# Patient Record
Sex: MALE | Race: WHITE | NOT HISPANIC OR LATINO | Employment: OTHER | URBAN - METROPOLITAN AREA
[De-identification: names, ages, dates, MRNs, and addresses within clinical notes are randomized per-mention and may not be internally consistent; named-entity substitution may affect disease eponyms.]

---

## 2023-03-27 DIAGNOSIS — F33.41 RECURRENT MAJOR DEPRESSIVE DISORDER, IN PARTIAL REMISSION (CMS-HCC): Primary | ICD-10-CM

## 2023-03-27 DIAGNOSIS — I10 PRIMARY HYPERTENSION: ICD-10-CM

## 2023-03-27 RX ORDER — VILAZODONE HYDROCHLORIDE 40 MG/1
40 TABLET ORAL DAILY
Qty: 30 TABLET | Refills: 11 | Status: SHIPPED | OUTPATIENT
Start: 2023-03-27 | End: 2024-04-19 | Stop reason: SDUPTHER

## 2023-03-27 RX ORDER — LOSARTAN POTASSIUM 100 MG/1
100 TABLET ORAL DAILY
COMMUNITY
End: 2023-03-27 | Stop reason: SDUPTHER

## 2023-03-27 RX ORDER — VILAZODONE HYDROCHLORIDE 40 MG/1
1 TABLET ORAL DAILY
COMMUNITY
Start: 2022-03-01 | End: 2023-03-27 | Stop reason: SDUPTHER

## 2023-03-27 RX ORDER — LOSARTAN POTASSIUM 100 MG/1
100 TABLET ORAL DAILY
Qty: 30 TABLET | Refills: 11 | Status: SHIPPED | OUTPATIENT
Start: 2023-03-27 | End: 2024-03-25 | Stop reason: SDUPTHER

## 2023-04-14 DIAGNOSIS — E78.01 FAMILIAL HYPERCHOLESTEROLEMIA: ICD-10-CM

## 2023-04-14 DIAGNOSIS — I25.10 CORONARY ARTERY DISEASE INVOLVING NATIVE HEART, UNSPECIFIED VESSEL OR LESION TYPE, UNSPECIFIED WHETHER ANGINA PRESENT: ICD-10-CM

## 2023-04-14 DIAGNOSIS — G47.00 INSOMNIA, UNSPECIFIED TYPE: Primary | ICD-10-CM

## 2023-04-14 RX ORDER — PRAMIPEXOLE DIHYDROCHLORIDE 0.5 MG/1
TABLET ORAL
COMMUNITY
End: 2023-12-19 | Stop reason: ALTCHOICE

## 2023-04-14 RX ORDER — CLOPIDOGREL BISULFATE 75 MG/1
75 TABLET ORAL DAILY
COMMUNITY
End: 2023-06-13 | Stop reason: SDUPTHER

## 2023-04-14 RX ORDER — BUPROPION HYDROCHLORIDE 200 MG/1
200 TABLET, EXTENDED RELEASE ORAL EVERY 12 HOURS
COMMUNITY
End: 2023-09-11 | Stop reason: SDUPTHER

## 2023-04-14 RX ORDER — TRAZODONE HYDROCHLORIDE 50 MG/1
TABLET ORAL
Qty: 135 TABLET | Refills: 3 | Status: SHIPPED | OUTPATIENT
Start: 2023-04-14 | End: 2024-04-18 | Stop reason: SDUPTHER

## 2023-04-14 RX ORDER — LEVOTHYROXINE SODIUM 175 UG/1
175 TABLET ORAL DAILY
COMMUNITY
End: 2023-05-01 | Stop reason: SDUPTHER

## 2023-04-14 RX ORDER — QUETIAPINE FUMARATE 50 MG/1
100 TABLET, FILM COATED ORAL NIGHTLY
COMMUNITY
End: 2023-08-22 | Stop reason: SDUPTHER

## 2023-04-14 RX ORDER — ROSUVASTATIN CALCIUM 40 MG/1
40 TABLET, COATED ORAL DAILY
COMMUNITY
End: 2023-04-14 | Stop reason: SDUPTHER

## 2023-04-14 RX ORDER — EZETIMIBE 10 MG/1
10 TABLET ORAL DAILY
COMMUNITY
End: 2024-01-15 | Stop reason: SDUPTHER

## 2023-04-14 RX ORDER — GABAPENTIN 100 MG/1
CAPSULE ORAL
COMMUNITY
End: 2023-06-14 | Stop reason: ALTCHOICE

## 2023-04-14 RX ORDER — TRAZODONE HYDROCHLORIDE 50 MG/1
TABLET ORAL
COMMUNITY
End: 2023-04-14 | Stop reason: SDUPTHER

## 2023-04-14 RX ORDER — DONEPEZIL HYDROCHLORIDE 5 MG/1
5 TABLET, FILM COATED ORAL DAILY
COMMUNITY
End: 2023-11-14 | Stop reason: DRUGHIGH

## 2023-04-14 RX ORDER — ESCITALOPRAM OXALATE 20 MG/1
TABLET ORAL
COMMUNITY
End: 2023-12-19 | Stop reason: ALTCHOICE

## 2023-04-14 RX ORDER — ASPIRIN 81 MG/1
TABLET ORAL 2 TIMES DAILY
COMMUNITY
Start: 2021-06-03 | End: 2023-12-19 | Stop reason: ALTCHOICE

## 2023-04-14 RX ORDER — ROSUVASTATIN CALCIUM 40 MG/1
40 TABLET, COATED ORAL DAILY
Qty: 90 TABLET | Refills: 3 | Status: SHIPPED | OUTPATIENT
Start: 2023-04-14 | End: 2024-04-08 | Stop reason: SDUPTHER

## 2023-05-01 DIAGNOSIS — E03.9 HYPOTHYROIDISM, UNSPECIFIED TYPE: Primary | ICD-10-CM

## 2023-05-02 RX ORDER — LEVOTHYROXINE SODIUM 175 UG/1
175 TABLET ORAL DAILY
Qty: 30 TABLET | Refills: 2 | Status: SHIPPED | OUTPATIENT
Start: 2023-05-02 | End: 2023-08-06 | Stop reason: SDUPTHER

## 2023-06-13 DIAGNOSIS — I25.10 CORONARY ARTERY DISEASE INVOLVING NATIVE CORONARY ARTERY OF NATIVE HEART WITHOUT ANGINA PECTORIS: Primary | ICD-10-CM

## 2023-06-13 RX ORDER — CLOPIDOGREL BISULFATE 75 MG/1
75 TABLET ORAL DAILY
Qty: 90 TABLET | Refills: 3 | Status: SHIPPED | OUTPATIENT
Start: 2023-06-13 | End: 2023-09-11 | Stop reason: SDUPTHER

## 2023-06-14 ENCOUNTER — OFFICE VISIT (OUTPATIENT)
Dept: PRIMARY CARE | Facility: CLINIC | Age: 68
End: 2023-06-14
Payer: MEDICARE

## 2023-06-14 VITALS
HEART RATE: 60 BPM | BODY MASS INDEX: 24.48 KG/M2 | TEMPERATURE: 97.5 F | SYSTOLIC BLOOD PRESSURE: 128 MMHG | HEIGHT: 67 IN | WEIGHT: 156 LBS | OXYGEN SATURATION: 97 % | DIASTOLIC BLOOD PRESSURE: 70 MMHG

## 2023-06-14 DIAGNOSIS — E78.2 MIXED HYPERLIPIDEMIA: ICD-10-CM

## 2023-06-14 DIAGNOSIS — Z00.00 MEDICARE ANNUAL WELLNESS VISIT, SUBSEQUENT: ICD-10-CM

## 2023-06-14 DIAGNOSIS — M17.12 PRIMARY OSTEOARTHRITIS OF LEFT KNEE: ICD-10-CM

## 2023-06-14 DIAGNOSIS — F32.A ANXIETY AND DEPRESSION: ICD-10-CM

## 2023-06-14 DIAGNOSIS — Z12.5 SCREENING FOR PROSTATE CANCER: ICD-10-CM

## 2023-06-14 DIAGNOSIS — I10 BENIGN ESSENTIAL HYPERTENSION: ICD-10-CM

## 2023-06-14 DIAGNOSIS — D50.0 IRON DEFICIENCY ANEMIA SECONDARY TO BLOOD LOSS (CHRONIC): ICD-10-CM

## 2023-06-14 DIAGNOSIS — Z00.00 ROUTINE GENERAL MEDICAL EXAMINATION AT HEALTH CARE FACILITY: Primary | ICD-10-CM

## 2023-06-14 DIAGNOSIS — G25.81 RESTLESS LEGS SYNDROME: ICD-10-CM

## 2023-06-14 DIAGNOSIS — F51.04 CHRONIC INSOMNIA: ICD-10-CM

## 2023-06-14 DIAGNOSIS — E55.9 VITAMIN D DEFICIENCY: ICD-10-CM

## 2023-06-14 DIAGNOSIS — E03.8 OTHER SPECIFIED HYPOTHYROIDISM: ICD-10-CM

## 2023-06-14 DIAGNOSIS — Z12.11 COLON CANCER SCREENING: ICD-10-CM

## 2023-06-14 DIAGNOSIS — F41.9 ANXIETY AND DEPRESSION: ICD-10-CM

## 2023-06-14 DIAGNOSIS — G31.83 DEMENTIA, LEWY BODY WITH BEHAVIOR DISTURBANCE (MULTI): ICD-10-CM

## 2023-06-14 DIAGNOSIS — R63.4 WEIGHT LOSS: ICD-10-CM

## 2023-06-14 DIAGNOSIS — F02.818 DEMENTIA, LEWY BODY WITH BEHAVIOR DISTURBANCE (MULTI): ICD-10-CM

## 2023-06-14 PROBLEM — M19.012 GLENOHUMERAL ARTHRITIS, LEFT: Status: ACTIVE | Noted: 2023-06-14

## 2023-06-14 PROBLEM — Z86.0100 HISTORY OF COLON POLYPS: Status: ACTIVE | Noted: 2023-06-14

## 2023-06-14 PROBLEM — M25.612 DECREASED RANGE OF MOTION OF LEFT SHOULDER: Status: ACTIVE | Noted: 2023-06-14

## 2023-06-14 PROBLEM — E03.9 HYPOTHYROIDISM: Status: ACTIVE | Noted: 2023-06-14

## 2023-06-14 PROBLEM — Z96.651 STATUS POST TOTAL RIGHT KNEE REPLACEMENT: Status: ACTIVE | Noted: 2023-06-14

## 2023-06-14 PROBLEM — R42 POSTURAL DIZZINESS WITH PRESYNCOPE: Status: ACTIVE | Noted: 2023-06-14

## 2023-06-14 PROBLEM — F03.90 DEMENTIA (MULTI): Status: ACTIVE | Noted: 2023-06-14

## 2023-06-14 PROBLEM — I25.10 ASHD (ARTERIOSCLEROTIC HEART DISEASE): Status: ACTIVE | Noted: 2023-06-14

## 2023-06-14 PROBLEM — I89.0 LYMPHEDEMA OF RIGHT LOWER EXTREMITY: Status: ACTIVE | Noted: 2023-06-14

## 2023-06-14 PROBLEM — M75.21 BICEPS TENDINITIS OF RIGHT UPPER EXTREMITY: Status: ACTIVE | Noted: 2023-06-14

## 2023-06-14 PROBLEM — S46.012A TRAUMATIC COMPLETE TEAR OF LEFT ROTATOR CUFF: Status: ACTIVE | Noted: 2023-06-14

## 2023-06-14 PROBLEM — Z86.010 HISTORY OF COLON POLYPS: Status: ACTIVE | Noted: 2023-06-14

## 2023-06-14 PROBLEM — R55 POSTURAL DIZZINESS WITH PRESYNCOPE: Status: ACTIVE | Noted: 2023-06-14

## 2023-06-14 PROBLEM — S40.012S: Status: ACTIVE | Noted: 2023-06-14

## 2023-06-14 PROBLEM — M21.169 VARUS DEFORMITY OF KNEE: Status: ACTIVE | Noted: 2023-06-14

## 2023-06-14 PROBLEM — H91.13 PRESBYCUSIS OF BOTH EARS: Status: ACTIVE | Noted: 2023-06-14

## 2023-06-14 PROBLEM — R06.09 DOE (DYSPNEA ON EXERTION): Status: ACTIVE | Noted: 2023-06-14

## 2023-06-14 PROBLEM — Z95.5 PRESENCE OF CORONARY ANGIOPLASTY IMPLANT AND GRAFT: Status: ACTIVE | Noted: 2023-06-14

## 2023-06-14 PROCEDURE — 20610 DRAIN/INJ JOINT/BURSA W/O US: CPT | Performed by: FAMILY MEDICINE

## 2023-06-14 PROCEDURE — 1170F FXNL STATUS ASSESSED: CPT | Performed by: FAMILY MEDICINE

## 2023-06-14 PROCEDURE — 99214 OFFICE O/P EST MOD 30 MIN: CPT | Performed by: FAMILY MEDICINE

## 2023-06-14 PROCEDURE — 3074F SYST BP LT 130 MM HG: CPT | Performed by: FAMILY MEDICINE

## 2023-06-14 PROCEDURE — 1159F MED LIST DOCD IN RCRD: CPT | Performed by: FAMILY MEDICINE

## 2023-06-14 PROCEDURE — 3078F DIAST BP <80 MM HG: CPT | Performed by: FAMILY MEDICINE

## 2023-06-14 PROCEDURE — G0439 PPPS, SUBSEQ VISIT: HCPCS | Performed by: FAMILY MEDICINE

## 2023-06-14 PROCEDURE — 1160F RVW MEDS BY RX/DR IN RCRD: CPT | Performed by: FAMILY MEDICINE

## 2023-06-14 RX ORDER — ALPRAZOLAM 0.5 MG/1
TABLET ORAL
COMMUNITY
End: 2023-12-19 | Stop reason: ALTCHOICE

## 2023-06-14 RX ORDER — IBUPROFEN 100 MG/5ML
1 SUSPENSION, ORAL (FINAL DOSE FORM) ORAL 2 TIMES DAILY
COMMUNITY
Start: 2020-11-17

## 2023-06-14 RX ORDER — GABAPENTIN 300 MG/1
1 CAPSULE ORAL NIGHTLY
COMMUNITY
Start: 2022-11-07 | End: 2023-11-14 | Stop reason: DRUGHIGH

## 2023-06-14 RX ORDER — PRAMIPEXOLE DIHYDROCHLORIDE 1.5 MG/1
1.5 TABLET ORAL NIGHTLY
COMMUNITY
End: 2023-12-19 | Stop reason: ALTCHOICE

## 2023-06-14 RX ORDER — MULTIVIT-MIN/FA/LYCOPEN/LUTEIN .4-300-25
1 TABLET ORAL DAILY
COMMUNITY
Start: 2020-11-17

## 2023-06-14 RX ORDER — DEXTROMETHORPHAN HYDROBROMIDE, GUAIFENESIN 5; 100 MG/5ML; MG/5ML
2 LIQUID ORAL 2 TIMES DAILY
COMMUNITY
Start: 2020-11-17

## 2023-06-14 RX ORDER — TRIAMCINOLONE ACETONIDE 40 MG/ML
40 INJECTION, SUSPENSION INTRA-ARTICULAR; INTRAMUSCULAR ONCE
Status: COMPLETED | OUTPATIENT
Start: 2023-06-14 | End: 2023-06-14

## 2023-06-14 RX ORDER — ACETAMINOPHEN 160 MG/5ML
SUSPENSION, ORAL (FINAL DOSE FORM) ORAL
COMMUNITY
Start: 2020-11-17

## 2023-06-14 RX ADMIN — TRIAMCINOLONE ACETONIDE 40 MG: 40 INJECTION, SUSPENSION INTRA-ARTICULAR; INTRAMUSCULAR at 12:08

## 2023-06-14 ASSESSMENT — PATIENT HEALTH QUESTIONNAIRE - PHQ9
2. FEELING DOWN, DEPRESSED OR HOPELESS: NOT AT ALL
SUM OF ALL RESPONSES TO PHQ9 QUESTIONS 1 AND 2: 0
1. LITTLE INTEREST OR PLEASURE IN DOING THINGS: NOT AT ALL

## 2023-06-14 ASSESSMENT — ACTIVITIES OF DAILY LIVING (ADL)
DOING_HOUSEWORK: NEEDS ASSISTANCE
BATHING: INDEPENDENT
DRESSING: INDEPENDENT
TAKING_MEDICATION: NEEDS ASSISTANCE
MANAGING_FINANCES: NEEDS ASSISTANCE
GROCERY_SHOPPING: NEEDS ASSISTANCE

## 2023-06-14 NOTE — PROGRESS NOTES
"Subjective   Reason for Visit: Ousmane Samuel is an 67 y.o. male here for a Medicare Wellness visit.          Reviewed all medications by prescribing practitioner or clinical pharmacist (such as prescriptions, OTCs, herbal therapies and supplements) and documented in the medical record.    HPI  Reviewed Chronic illnesess    Pain in the in the left leg knee pain.  Is acting up a lot.  Up and down steps makes worse. Having pain in the knee posterior.  No swelling.  Walking makes it worse and   Was about a week or so and had the pain.    Had relief with knee joint injection  Patient Care Team:  Mason Michael DO as PCP - General  Mason Michael DO as PCP - [facility] Attributed Provider     Review of Systems    Objective   Vitals:  /70   Pulse 60   Temp 36.4 °C (97.5 °F)   Ht 1.689 m (5' 6.5\")   Wt 70.8 kg (156 lb)   SpO2 97%   BMI 24.80 kg/m²       Physical Exam  General: Patient is alert and oriented Ãƒâ€”3 and appears in no acute distress. No respiratory distress.     Head: Atraumatic normocephalic.     Eyes: EOMI, PERRLA      Neck: Normal range of motion, no masses. Thyroid is palpable and normal in size without any nodules. No anterior cervical or posterior cervical adenopathy.     Heart: Regular rate and rhythm, every 3rd beat was a dropped beat which the patient states is his normal, no murmurs clicks or gallops     Lungs: Clear to auscultation bilaterally without any rhonchi rales or wheezing, lung sounds heard throughout all lung fields     Abdomen: Soft, nontender, no rigidity, rebound, guarding or organomegaly. Bowel sounds Ãƒâ€”4 quadrants.     Musculoskeletal: Normal range of motion, strength is grossly intact in the proximal distal muscles of the upper and lower extremities bilaterally, deep tendon reflexes +2 out of 4 and symmetric bilaterally at the patella, Achilles, biceps, triceps, sensation intact.     Nerves: Cranial nerves II through XII appear grossly intact and without deficit   "   Skin: Intact, dry, no rashes or erythema     Psych: Flat affect   Assessment/Plan   Problem List Items Addressed This Visit    None   insomnia  Seroquel 100 mg at bedtime  Trazodone      Discussed depression and anxiety.   -anxiety worse so we increased Viibryd 40 mg daily     Coronary artery disease with history of stents  Previous doctor was Dr. Russell Currently stable     Expressions dementia with behavioral disturbances secondary to Lewy body dementia  Neurologist is Dr. Bishop     Hyperlipidemia  Controlled on Zetia 10 mg 1 p.o. daily and rosuvastatin 40 mg 1 p.o. daily     Hypothyroidism  Controlled on levothyroxine 175 mcg daily     Essential hypertension  Controlled on losartan 100 mg daily     RLS  - Stopped Requip and hallucinations are improving.   - Gabapentin is helping at 300 mg and they are holding off on increasing     Left knee pain/osteoarthritis  The patient was prepped in a sterile fashion using iodide swabs and alcohol swabs.  1% lidocaine and 40 mg of Kenalog were injected into the joint using a 22-gauge needle.  Patient tolerated the procedure well.  No complications.  No blood loss.  Patient was instructed to ice the area of the injection for 15 minutes 3 times a day for the next 2 days.  Call if there is any redness, swelling, worsening pain or reaction to the medication.     Wife concerned about weight loss   - Colonoscopy reordered due to polyps  - PSA ordered  - No night sweats/ appetite changes/ abnormal lumps     Labs and medicare well in 6 months

## 2023-06-15 ENCOUNTER — LAB (OUTPATIENT)
Dept: LAB | Facility: LAB | Age: 68
End: 2023-06-15
Payer: MEDICARE

## 2023-06-15 DIAGNOSIS — Z12.5 SCREENING FOR PROSTATE CANCER: ICD-10-CM

## 2023-06-15 DIAGNOSIS — R63.4 WEIGHT LOSS: ICD-10-CM

## 2023-06-15 LAB
ALANINE AMINOTRANSFERASE (SGPT) (U/L) IN SER/PLAS: 21 U/L (ref 10–52)
ALBUMIN (G/DL) IN SER/PLAS: 4.5 G/DL (ref 3.4–5)
ALKALINE PHOSPHATASE (U/L) IN SER/PLAS: 46 U/L (ref 33–136)
ANION GAP IN SER/PLAS: 14 MMOL/L (ref 10–20)
ASPARTATE AMINOTRANSFERASE (SGOT) (U/L) IN SER/PLAS: 20 U/L (ref 9–39)
BASOPHILS (10*3/UL) IN BLOOD BY AUTOMATED COUNT: 0.01 X10E9/L (ref 0–0.1)
BASOPHILS/100 LEUKOCYTES IN BLOOD BY AUTOMATED COUNT: 0.1 % (ref 0–2)
BILIRUBIN TOTAL (MG/DL) IN SER/PLAS: 0.4 MG/DL (ref 0–1.2)
CALCIUM (MG/DL) IN SER/PLAS: 9.4 MG/DL (ref 8.6–10.3)
CARBON DIOXIDE, TOTAL (MMOL/L) IN SER/PLAS: 26 MMOL/L (ref 21–32)
CHLORIDE (MMOL/L) IN SER/PLAS: 106 MMOL/L (ref 98–107)
COBALAMIN (VITAMIN B12) (PG/ML) IN SER/PLAS: 307 PG/ML (ref 211–911)
CREATININE (MG/DL) IN SER/PLAS: 1.13 MG/DL (ref 0.5–1.3)
ERYTHROCYTE DISTRIBUTION WIDTH (RATIO) BY AUTOMATED COUNT: 11.9 % (ref 11.5–14.5)
ERYTHROCYTE MEAN CORPUSCULAR HEMOGLOBIN CONCENTRATION (G/DL) BY AUTOMATED: 32.9 G/DL (ref 32–36)
ERYTHROCYTE MEAN CORPUSCULAR VOLUME (FL) BY AUTOMATED COUNT: 97 FL (ref 80–100)
ERYTHROCYTES (10*6/UL) IN BLOOD BY AUTOMATED COUNT: 4.06 X10E12/L (ref 4.5–5.9)
GFR MALE: 70 ML/MIN/1.73M2
GLUCOSE (MG/DL) IN SER/PLAS: 115 MG/DL (ref 74–99)
HEMATOCRIT (%) IN BLOOD BY AUTOMATED COUNT: 39.2 % (ref 41–52)
HEMOGLOBIN (G/DL) IN BLOOD: 12.9 G/DL (ref 13.5–17.5)
IMMATURE GRANULOCYTES/100 LEUKOCYTES IN BLOOD BY AUTOMATED COUNT: 0.4 % (ref 0–0.9)
LEUKOCYTES (10*3/UL) IN BLOOD BY AUTOMATED COUNT: 11.3 X10E9/L (ref 4.4–11.3)
LYMPHOCYTES (10*3/UL) IN BLOOD BY AUTOMATED COUNT: 0.99 X10E9/L (ref 1.2–4.8)
LYMPHOCYTES/100 LEUKOCYTES IN BLOOD BY AUTOMATED COUNT: 8.8 % (ref 13–44)
MONOCYTES (10*3/UL) IN BLOOD BY AUTOMATED COUNT: 1.71 X10E9/L (ref 0.1–1)
MONOCYTES/100 LEUKOCYTES IN BLOOD BY AUTOMATED COUNT: 15.1 % (ref 2–10)
NEUTROPHILS (10*3/UL) IN BLOOD BY AUTOMATED COUNT: 8.53 X10E9/L (ref 1.2–7.7)
NEUTROPHILS/100 LEUKOCYTES IN BLOOD BY AUTOMATED COUNT: 75.6 % (ref 40–80)
PLATELETS (10*3/UL) IN BLOOD AUTOMATED COUNT: 161 X10E9/L (ref 150–450)
POTASSIUM (MMOL/L) IN SER/PLAS: 4.5 MMOL/L (ref 3.5–5.3)
PROSTATE SPECIFIC ANTIGEN,SCREEN: 1.03 NG/ML (ref 0–4)
PROTEIN TOTAL: 6.7 G/DL (ref 6.4–8.2)
SODIUM (MMOL/L) IN SER/PLAS: 141 MMOL/L (ref 136–145)
THYROTROPIN (MIU/L) IN SER/PLAS BY DETECTION LIMIT <= 0.05 MIU/L: 0.48 MIU/L (ref 0.44–3.98)
UREA NITROGEN (MG/DL) IN SER/PLAS: 24 MG/DL (ref 6–23)

## 2023-06-15 PROCEDURE — 36415 COLL VENOUS BLD VENIPUNCTURE: CPT

## 2023-06-15 PROCEDURE — 82607 VITAMIN B-12: CPT

## 2023-06-15 PROCEDURE — 80053 COMPREHEN METABOLIC PANEL: CPT

## 2023-06-15 PROCEDURE — 84443 ASSAY THYROID STIM HORMONE: CPT

## 2023-06-15 PROCEDURE — G0103 PSA SCREENING: HCPCS

## 2023-06-15 PROCEDURE — 85025 COMPLETE CBC W/AUTO DIFF WBC: CPT

## 2023-06-18 NOTE — RESULT ENCOUNTER NOTE
Please let the patient know that labs showed blood count slightly low but it is no change from what it previously had been.  Does look like he is fighting off possible infection.  B12 was low and I would definitely suggest that he take vitamin B12 sublingual 1000 mcg daily.  His thyroid is in a normal range but his TSH is definitely towards the lower but he was not having hyperthyroid symptoms outside of the weight loss so we will continue on the current dose.  PSA was normal for the prostate.

## 2023-06-19 ENCOUNTER — TELEPHONE (OUTPATIENT)
Dept: PRIMARY CARE | Facility: CLINIC | Age: 68
End: 2023-06-19
Payer: MEDICARE

## 2023-06-19 NOTE — TELEPHONE ENCOUNTER
----- Message from Megha Rios RN sent at 6/19/2023  8:32 AM EDT -----    ----- Message -----  From: Mason Michael DO  Sent: 6/18/2023   7:21 PM EDT  To: [id number] [place] [id number] Clinical Support Staff    Please let the patient know that labs showed blood count slightly low but it is no change from what it previously had been.  Does look like he is fighting off possible infection.  B12 was low and I would definitely suggest that he take vitamin B12 sublingual 1000 mcg daily.  His thyroid is in a normal range but his TSH is definitely towards the lower but he was not having hyperthyroid symptoms outside of the weight loss so we will continue on the current dose.  PSA was normal for the prostate.

## 2023-08-04 ENCOUNTER — PATIENT MESSAGE (OUTPATIENT)
Dept: PRIMARY CARE | Facility: CLINIC | Age: 68
End: 2023-08-04
Payer: MEDICARE

## 2023-08-04 DIAGNOSIS — E03.9 HYPOTHYROIDISM, UNSPECIFIED TYPE: ICD-10-CM

## 2023-08-06 RX ORDER — LEVOTHYROXINE SODIUM 175 UG/1
175 TABLET ORAL DAILY
Qty: 90 TABLET | Refills: 3 | Status: SHIPPED | OUTPATIENT
Start: 2023-08-06 | End: 2024-08-05

## 2023-08-22 ENCOUNTER — PATIENT MESSAGE (OUTPATIENT)
Dept: PRIMARY CARE | Facility: CLINIC | Age: 68
End: 2023-08-22
Payer: MEDICARE

## 2023-08-22 DIAGNOSIS — G31.83 DEMENTIA, LEWY BODY WITH BEHAVIOR DISTURBANCE (MULTI): Primary | ICD-10-CM

## 2023-08-22 DIAGNOSIS — F02.818 DEMENTIA, LEWY BODY WITH BEHAVIOR DISTURBANCE (MULTI): Primary | ICD-10-CM

## 2023-08-22 RX ORDER — QUETIAPINE FUMARATE 50 MG/1
100 TABLET, FILM COATED ORAL NIGHTLY
Qty: 180 TABLET | Refills: 3 | Status: SHIPPED | OUTPATIENT
Start: 2023-08-22 | End: 2023-12-19 | Stop reason: ALTCHOICE

## 2023-09-11 DIAGNOSIS — F32.A ANXIETY AND DEPRESSION: Primary | ICD-10-CM

## 2023-09-11 DIAGNOSIS — I25.10 CORONARY ARTERY DISEASE INVOLVING NATIVE CORONARY ARTERY OF NATIVE HEART WITHOUT ANGINA PECTORIS: ICD-10-CM

## 2023-09-11 DIAGNOSIS — F41.9 ANXIETY AND DEPRESSION: Primary | ICD-10-CM

## 2023-09-11 RX ORDER — CLOPIDOGREL BISULFATE 75 MG/1
75 TABLET ORAL DAILY
Qty: 90 TABLET | Refills: 3 | Status: SHIPPED | OUTPATIENT
Start: 2023-09-11 | End: 2024-09-10

## 2023-09-11 RX ORDER — BUPROPION HYDROCHLORIDE 200 MG/1
200 TABLET, EXTENDED RELEASE ORAL EVERY 12 HOURS
Qty: 180 TABLET | Refills: 3 | Status: SHIPPED | OUTPATIENT
Start: 2023-09-11 | End: 2024-09-10

## 2023-09-13 ENCOUNTER — HOSPITAL ENCOUNTER (OUTPATIENT)
Dept: DATA CONVERSION | Facility: HOSPITAL | Age: 68
End: 2023-09-13
Attending: INTERNAL MEDICINE | Admitting: INTERNAL MEDICINE
Payer: MEDICARE

## 2023-09-13 DIAGNOSIS — E03.9 HYPOTHYROIDISM, UNSPECIFIED: ICD-10-CM

## 2023-09-13 DIAGNOSIS — I10 ESSENTIAL (PRIMARY) HYPERTENSION: ICD-10-CM

## 2023-09-13 DIAGNOSIS — Z12.11 ENCOUNTER FOR SCREENING FOR MALIGNANT NEOPLASM OF COLON: ICD-10-CM

## 2023-09-13 DIAGNOSIS — F02.80 DEMENTIA IN OTHER DISEASES CLASSIFIED ELSEWHERE, UNSPECIFIED SEVERITY, WITHOUT BEHAVIORAL DISTURBANCE, PSYCHOTIC DISTURBANCE, MOOD DISTURBANCE, AND ANXIETY (MULTI): ICD-10-CM

## 2023-09-13 DIAGNOSIS — G31.83 NEUROCOGNITIVE DISORDER WITH LEWY BODIES (CODE) (MULTI): ICD-10-CM

## 2023-09-13 DIAGNOSIS — Z86.010 PERSONAL HISTORY OF COLONIC POLYPS: ICD-10-CM

## 2023-09-13 DIAGNOSIS — E78.5 HYPERLIPIDEMIA, UNSPECIFIED: ICD-10-CM

## 2023-09-13 DIAGNOSIS — I25.10 ATHEROSCLEROTIC HEART DISEASE OF NATIVE CORONARY ARTERY WITHOUT ANGINA PECTORIS: ICD-10-CM

## 2023-09-29 VITALS — BODY MASS INDEX: 26.57 KG/M2 | WEIGHT: 165.34 LBS | HEIGHT: 66 IN

## 2023-11-13 ENCOUNTER — APPOINTMENT (OUTPATIENT)
Dept: NEUROLOGY | Facility: HOSPITAL | Age: 68
End: 2023-11-13
Payer: MEDICARE

## 2023-11-14 ENCOUNTER — OFFICE VISIT (OUTPATIENT)
Dept: NEUROLOGY | Facility: HOSPITAL | Age: 68
End: 2023-11-14
Payer: MEDICARE

## 2023-11-14 VITALS
HEIGHT: 67 IN | RESPIRATION RATE: 18 BRPM | WEIGHT: 170 LBS | SYSTOLIC BLOOD PRESSURE: 138 MMHG | DIASTOLIC BLOOD PRESSURE: 79 MMHG | TEMPERATURE: 97.1 F | BODY MASS INDEX: 26.68 KG/M2 | HEART RATE: 54 BPM

## 2023-11-14 DIAGNOSIS — F03.B18 MODERATE DEMENTIA WITH BEHAVIORAL DISTURBANCE (MULTI): Primary | ICD-10-CM

## 2023-11-14 PROCEDURE — 3075F SYST BP GE 130 - 139MM HG: CPT | Performed by: PSYCHIATRY & NEUROLOGY

## 2023-11-14 PROCEDURE — 1160F RVW MEDS BY RX/DR IN RCRD: CPT | Performed by: PSYCHIATRY & NEUROLOGY

## 2023-11-14 PROCEDURE — 1126F AMNT PAIN NOTED NONE PRSNT: CPT | Performed by: PSYCHIATRY & NEUROLOGY

## 2023-11-14 PROCEDURE — 1159F MED LIST DOCD IN RCRD: CPT | Performed by: PSYCHIATRY & NEUROLOGY

## 2023-11-14 PROCEDURE — 3078F DIAST BP <80 MM HG: CPT | Performed by: PSYCHIATRY & NEUROLOGY

## 2023-11-14 PROCEDURE — 99215 OFFICE O/P EST HI 40 MIN: CPT | Performed by: PSYCHIATRY & NEUROLOGY

## 2023-11-14 RX ORDER — DONEPEZIL HYDROCHLORIDE 5 MG/1
TABLET, FILM COATED ORAL
Qty: 90 TABLET | Refills: 3 | Status: SHIPPED | OUTPATIENT
Start: 2023-11-14 | End: 2023-11-20 | Stop reason: DRUGHIGH

## 2023-11-14 RX ORDER — GABAPENTIN 300 MG/1
300 CAPSULE ORAL NIGHTLY
Qty: 90 CAPSULE | Refills: 3 | Status: SHIPPED | OUTPATIENT
Start: 2023-11-14 | End: 2023-11-20 | Stop reason: SDUPTHER

## 2023-11-14 ASSESSMENT — PAIN SCALES - GENERAL: PAINLEVEL: 0-NO PAIN
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
## 2023-11-14 NOTE — PATIENT INSTRUCTIONS
You were seen in the clinic by Dr. Bishop.   It was a pleasure seeing you today.      We discussed the findings of today's examination, discussed the diagnosis of dementia with Lewy bodies (DLB).  I'm glad that hallucinations improved after recent medication change.        Problems:       1. Probable dementia with Lewy bodies (DLB)  2. depression and anxiety, by history, in remission     PLAN:    1. We will raise the dose of donepezil to 10 mg daily.  Potential side effects include runny nose, nausea, gastric upset and slower heart rate.     1. Continue quetiapine 50 mg twice daily  Potential side effects include dizziness, fatigue, dry mouth and constipation     3. Continue gabapentin 300 mg at bedtime.     4. Continue trazodone 75 mg at bedtime     5. We discussed measures of a safe sleep environment in details, try melatonin 5 mg 30 minutes before bedtime     6. For your dry mouth, try using sour candies such as lemon drops or use Biotene/ACT mouthwashes to keep your mouth moisturized      7. For the lightheadedness, this may be due to your medication. We recommend adequate hydration, please check his blood pressure at the time to make sure it is not running low.     8. I recommend to continue not to drive     9. Follow up in 6 months or earlier if needed.      Please call [phone] in case of any questions.           The FDA risks of the cholinesterase inhibitors and specifically rivastigmine including bradycardia and hypotension which could lead to significant mortality and or morbidity was discussed. In the case of donepezil and galantamine we discussed the risk of dangerous dysrhythmias that can be associated with morbidity and mortality when combined with antidepressants and or antipsychotics. was clearly able to recite back risks and benefits as well as alternatives as discussed with you today.   We discussed the potential risk of serotonin syndrome which include fever, hallucinations, confusion,  myoclonus, extreme changes in blood pressure and increased heart rate from combining SSRI and trazodone.    General brain health guidelines:  - make sure your other medical conditions are well controlled (e.g., high blood pressure, high cholesterol, diabetes, sleep apnea etc)  - do not smoke or chew tobacco  - address any sensory deficits (e.g., proper glasses for poor eyesight, hearing aids for hearing loss)  - use a weekly pill box  - eat a heart healthy diet (e.g., lots of fruits and vegetables, low fat and cholesterol)  - exercise at least four days per week, 30 minutes at a time at an intensity that would make it difficult to converse with someone   - make sure you are getting at least 7 hours of quality sleep per night  - keep yourself mentally active daily by reading, playing cards, doing word searches, puzzles, etc.  - stay socially active by being part of a group or organization

## 2023-11-20 DIAGNOSIS — F03.918 DEMENTIA WITH OTHER BEHAVIORAL DISTURBANCE, UNSPECIFIED DEMENTIA SEVERITY, UNSPECIFIED DEMENTIA TYPE (MULTI): Primary | ICD-10-CM

## 2023-11-20 RX ORDER — GABAPENTIN 300 MG/1
300 CAPSULE ORAL NIGHTLY
Qty: 90 CAPSULE | Refills: 3 | Status: SHIPPED | OUTPATIENT
Start: 2023-11-20

## 2023-11-20 RX ORDER — DONEPEZIL HYDROCHLORIDE 10 MG/1
10 TABLET, FILM COATED ORAL DAILY
Qty: 90 TABLET | Refills: 3 | Status: SHIPPED | OUTPATIENT
Start: 2023-11-20 | End: 2024-11-19

## 2023-12-19 ENCOUNTER — OFFICE VISIT (OUTPATIENT)
Dept: PRIMARY CARE | Facility: CLINIC | Age: 68
End: 2023-12-19
Payer: MEDICARE

## 2023-12-19 VITALS
OXYGEN SATURATION: 96 % | HEIGHT: 67 IN | SYSTOLIC BLOOD PRESSURE: 128 MMHG | BODY MASS INDEX: 26.68 KG/M2 | DIASTOLIC BLOOD PRESSURE: 62 MMHG | TEMPERATURE: 97.9 F | HEART RATE: 55 BPM | WEIGHT: 170 LBS

## 2023-12-19 DIAGNOSIS — G25.81 RESTLESS LEGS SYNDROME: ICD-10-CM

## 2023-12-19 DIAGNOSIS — L60.2 THICKENED NAILS: Primary | ICD-10-CM

## 2023-12-19 DIAGNOSIS — E55.9 VITAMIN D DEFICIENCY: ICD-10-CM

## 2023-12-19 DIAGNOSIS — E03.9 HYPOTHYROIDISM, UNSPECIFIED TYPE: ICD-10-CM

## 2023-12-19 DIAGNOSIS — G31.83 DEMENTIA, LEWY BODY WITH BEHAVIOR DISTURBANCE (MULTI): ICD-10-CM

## 2023-12-19 DIAGNOSIS — F02.818 DEMENTIA, LEWY BODY WITH BEHAVIOR DISTURBANCE (MULTI): ICD-10-CM

## 2023-12-19 DIAGNOSIS — D50.0 IRON DEFICIENCY ANEMIA SECONDARY TO BLOOD LOSS (CHRONIC): ICD-10-CM

## 2023-12-19 DIAGNOSIS — I10 BENIGN ESSENTIAL HYPERTENSION: ICD-10-CM

## 2023-12-19 PROCEDURE — 1160F RVW MEDS BY RX/DR IN RCRD: CPT | Performed by: FAMILY MEDICINE

## 2023-12-19 PROCEDURE — 1126F AMNT PAIN NOTED NONE PRSNT: CPT | Performed by: FAMILY MEDICINE

## 2023-12-19 PROCEDURE — 1159F MED LIST DOCD IN RCRD: CPT | Performed by: FAMILY MEDICINE

## 2023-12-19 PROCEDURE — 99213 OFFICE O/P EST LOW 20 MIN: CPT | Performed by: FAMILY MEDICINE

## 2023-12-19 PROCEDURE — 3078F DIAST BP <80 MM HG: CPT | Performed by: FAMILY MEDICINE

## 2023-12-19 PROCEDURE — 3074F SYST BP LT 130 MM HG: CPT | Performed by: FAMILY MEDICINE

## 2023-12-19 ASSESSMENT — PATIENT HEALTH QUESTIONNAIRE - PHQ9
SUM OF ALL RESPONSES TO PHQ9 QUESTIONS 1 AND 2: 2
1. LITTLE INTEREST OR PLEASURE IN DOING THINGS: SEVERAL DAYS
2. FEELING DOWN, DEPRESSED OR HOPELESS: SEVERAL DAYS

## 2023-12-19 NOTE — PROGRESS NOTES
Subjective   Patient ID: Ousmane Samuel is a 68 y.o. male who presents for Follow-up (Fungus on his toes/).  HPI  Anxiety is more .  Depression is improved.  Having issues with thinking people are taking his things or worry about where his wife is.  Daily basis.       Toe nails yel;low and thick and crusty.  Has been for a while.  Has been soaking feet and no improvement        Review of Systems    Objective   Physical Exam  General: Patient is alert and oriented ×3 and appears in no acute distress. No respiratory distress.    Head: Atraumatic normocephalic.    Eyes: EOMI, PERRLA      Neck: Normal range of motion, no masses.  Thyroid is palpable and normal in size without any nodules. No anterior cervical or posterior cervical adenopathy.    Heart: Regular rate and rhythm, no murmurs clicks or gallops    Lungs: Clear to auscultation bilaterally without any rhonchi rales or wheezing, lung sounds heard throughout all lung fields    Nerves: Cranial nerves II through XII appear grossly intact and without deficit    Skin: I toenails have some thickened yellowed nails    Psych: Normal affect.  Assessment/Plan   Problem List Items Addressed This Visit    None  Look at the ferritin for the restless legs    Look at something to help with anxiety and agitation.  After reviewing medications I do not believe that there are any other good options at this time.  I believe we should try some nonpharmacological therapies    Consult Podiatrist    Other nonpharmacologic therapies have demonstrated some benefit for neuropsychiatric symptoms of dementia in small studies:  ?Aromatherapy has been studied in several small randomized trials in patients with dementia and agitation, with mixed results [35]. Aromatherapy is safe and well tolerated. Lemon balm or lavender oil are most frequently used and can be delivered by either inhalation or skin application. The mechanism by which these agents may be effective is unclear.  ?Exercise  training in combination with caregiver education may improve outcomes in patients with Alzheimer disease (AD). A randomized trial in 153 community-dwelling patients with AD found that compared with routine medical care, patients who were assigned to exercise (goal minimum of 30 minutes per day) and whose caregivers received training in managing behavioral problems had improved physical functioning and less depression [36]. We have found that regular physical activity can be very useful in managing behaviors in some patients with dementia.  ?Music therapy and pet therapy also have some evidence of efficacy [37-40].  ?In preliminary studies, massage and touch therapy appear to be potentially beneficial in the immediate management of agitated behavior and in encouragement to eat [41].

## 2023-12-27 ENCOUNTER — LAB (OUTPATIENT)
Dept: LAB | Facility: LAB | Age: 68
End: 2023-12-27
Payer: MEDICARE

## 2023-12-27 DIAGNOSIS — D50.0 IRON DEFICIENCY ANEMIA SECONDARY TO BLOOD LOSS (CHRONIC): ICD-10-CM

## 2023-12-27 DIAGNOSIS — E03.9 HYPOTHYROIDISM, UNSPECIFIED TYPE: ICD-10-CM

## 2023-12-27 DIAGNOSIS — I10 BENIGN ESSENTIAL HYPERTENSION: ICD-10-CM

## 2023-12-27 DIAGNOSIS — G25.81 RESTLESS LEGS SYNDROME: ICD-10-CM

## 2023-12-27 LAB
ALBUMIN SERPL BCP-MCNC: 4.3 G/DL (ref 3.4–5)
ALP SERPL-CCNC: 48 U/L (ref 33–136)
ALT SERPL W P-5'-P-CCNC: 29 U/L (ref 10–52)
ANION GAP SERPL CALC-SCNC: 10 MMOL/L (ref 10–20)
AST SERPL W P-5'-P-CCNC: 21 U/L (ref 9–39)
BASOPHILS # BLD AUTO: 0.02 X10*3/UL (ref 0–0.1)
BASOPHILS NFR BLD AUTO: 0.3 %
BILIRUB SERPL-MCNC: 0.4 MG/DL (ref 0–1.2)
BUN SERPL-MCNC: 18 MG/DL (ref 6–23)
CALCIUM SERPL-MCNC: 9.3 MG/DL (ref 8.6–10.3)
CHLORIDE SERPL-SCNC: 107 MMOL/L (ref 98–107)
CO2 SERPL-SCNC: 29 MMOL/L (ref 21–32)
CREAT SERPL-MCNC: 0.86 MG/DL (ref 0.5–1.3)
EOSINOPHIL # BLD AUTO: 0.1 X10*3/UL (ref 0–0.7)
EOSINOPHIL NFR BLD AUTO: 1.3 %
ERYTHROCYTE [DISTWIDTH] IN BLOOD BY AUTOMATED COUNT: 12 % (ref 11.5–14.5)
FERRITIN SERPL-MCNC: 35 NG/ML (ref 20–300)
GFR SERPL CREATININE-BSD FRML MDRD: >90 ML/MIN/1.73M*2
GLUCOSE SERPL-MCNC: 86 MG/DL (ref 74–99)
HCT VFR BLD AUTO: 41.7 % (ref 41–52)
HGB BLD-MCNC: 13.4 G/DL (ref 13.5–17.5)
IMM GRANULOCYTES # BLD AUTO: 0.04 X10*3/UL (ref 0–0.7)
IMM GRANULOCYTES NFR BLD AUTO: 0.5 % (ref 0–0.9)
LYMPHOCYTES # BLD AUTO: 1.01 X10*3/UL (ref 1.2–4.8)
LYMPHOCYTES NFR BLD AUTO: 13.1 %
MCH RBC QN AUTO: 30.6 PG (ref 26–34)
MCHC RBC AUTO-ENTMCNC: 32.1 G/DL (ref 32–36)
MCV RBC AUTO: 95 FL (ref 80–100)
MONOCYTES # BLD AUTO: 1.02 X10*3/UL (ref 0.1–1)
MONOCYTES NFR BLD AUTO: 13.2 %
NEUTROPHILS # BLD AUTO: 5.54 X10*3/UL (ref 1.2–7.7)
NEUTROPHILS NFR BLD AUTO: 71.6 %
NRBC BLD-RTO: 0 /100 WBCS (ref 0–0)
PLATELET # BLD AUTO: 146 X10*3/UL (ref 150–450)
POTASSIUM SERPL-SCNC: 3.8 MMOL/L (ref 3.5–5.3)
PROT SERPL-MCNC: 6.1 G/DL (ref 6.4–8.2)
RBC # BLD AUTO: 4.38 X10*6/UL (ref 4.5–5.9)
SODIUM SERPL-SCNC: 142 MMOL/L (ref 136–145)
T3FREE SERPL-MCNC: 2.6 PG/ML (ref 2.3–4.2)
T4 FREE SERPL-MCNC: 0.74 NG/DL (ref 0.61–1.12)
WBC # BLD AUTO: 7.7 X10*3/UL (ref 4.4–11.3)

## 2023-12-27 PROCEDURE — 84481 FREE ASSAY (FT-3): CPT

## 2023-12-27 PROCEDURE — 36415 COLL VENOUS BLD VENIPUNCTURE: CPT

## 2023-12-27 PROCEDURE — 85025 COMPLETE CBC W/AUTO DIFF WBC: CPT

## 2023-12-27 PROCEDURE — 82728 ASSAY OF FERRITIN: CPT

## 2023-12-27 PROCEDURE — 84439 ASSAY OF FREE THYROXINE: CPT

## 2023-12-27 PROCEDURE — 80053 COMPREHEN METABOLIC PANEL: CPT

## 2024-01-07 DIAGNOSIS — U07.1 COVID-19: Primary | ICD-10-CM

## 2024-01-15 DIAGNOSIS — E78.2 MIXED HYPERLIPIDEMIA: Primary | ICD-10-CM

## 2024-01-15 RX ORDER — EZETIMIBE 10 MG/1
10 TABLET ORAL DAILY
Qty: 90 TABLET | Refills: 3 | Status: SHIPPED | OUTPATIENT
Start: 2024-01-15 | End: 2025-01-14

## 2024-01-15 NOTE — TELEPHONE ENCOUNTER
----- Message from Rhoda Samuel on behalf of Ousmane Samuel sent at 1/15/2024 10:33 AM EST -----  Regarding: Refill for Bill  Contact: [phone]  Morning, Mason!    Can I please get a refill for Tung’s Ezitimibe, 10 mg, sent to [facility] in [place]?  : 1955.  Thanks much!    Rhoda

## 2024-02-28 ENCOUNTER — PATIENT MESSAGE (OUTPATIENT)
Dept: PRIMARY CARE | Facility: CLINIC | Age: 69
End: 2024-02-28
Payer: MEDICARE

## 2024-02-28 DIAGNOSIS — Z76.0 MEDICATION REFILL: Primary | ICD-10-CM

## 2024-02-29 RX ORDER — QUETIAPINE FUMARATE 50 MG/1
50 TABLET, FILM COATED ORAL 2 TIMES DAILY
Qty: 180 TABLET | Refills: 0 | Status: SHIPPED | OUTPATIENT
Start: 2024-02-29 | End: 2024-03-18 | Stop reason: SDUPTHER

## 2024-02-29 RX ORDER — QUETIAPINE FUMARATE 50 MG/1
TABLET, FILM COATED ORAL
COMMUNITY
Start: 2022-03-30 | End: 2024-02-29 | Stop reason: SDUPTHER

## 2024-02-29 NOTE — TELEPHONE ENCOUNTER
From: Ousmane Samuel  To: Mason Michael DO  Sent: 2/28/2024 3:11 PM EST  Subject: Refill for Tung    I tried to refill Tung’s quetiapine and the pharmacy said that script was cancelled. This is the drug that keeps his hallucinations/anxiety at bay…is there a reason we aren’t doing that drug anymore?    I only have a few days of pills left for him.    Thanks,  Rhoda

## 2024-03-18 DIAGNOSIS — Z76.0 MEDICATION REFILL: ICD-10-CM

## 2024-03-18 RX ORDER — QUETIAPINE FUMARATE 50 MG/1
50 TABLET, FILM COATED ORAL 2 TIMES DAILY
Qty: 180 TABLET | Refills: 0 | Status: SHIPPED | OUTPATIENT
Start: 2024-03-18 | End: 2024-05-14 | Stop reason: SDUPTHER

## 2024-03-25 ENCOUNTER — TELEPHONE (OUTPATIENT)
Dept: PRIMARY CARE | Facility: CLINIC | Age: 69
End: 2024-03-25
Payer: MEDICARE

## 2024-03-25 DIAGNOSIS — I10 PRIMARY HYPERTENSION: ICD-10-CM

## 2024-03-25 RX ORDER — LOSARTAN POTASSIUM 100 MG/1
100 TABLET ORAL DAILY
Qty: 30 TABLET | Refills: 11 | Status: SHIPPED | OUTPATIENT
Start: 2024-03-25

## 2024-03-25 NOTE — TELEPHONE ENCOUNTER
----- Message from Rhoda Samuel on behalf of Ousmane Samuel sent at 3/25/2024  1:53 PM EDT -----  Regarding: Refill for Bill  Contact: [phone]  Pavan Cuevas,    Can I get a refill for Bill’s Losartan - 100 mg?  [facility] is [facility].    Thanks much!  Rhoda

## 2024-04-08 ENCOUNTER — PATIENT MESSAGE (OUTPATIENT)
Dept: PRIMARY CARE | Facility: CLINIC | Age: 69
End: 2024-04-08
Payer: MEDICARE

## 2024-04-08 DIAGNOSIS — I25.10 CORONARY ARTERY DISEASE INVOLVING NATIVE HEART, UNSPECIFIED VESSEL OR LESION TYPE, UNSPECIFIED WHETHER ANGINA PRESENT: ICD-10-CM

## 2024-04-08 RX ORDER — ROSUVASTATIN CALCIUM 40 MG/1
40 TABLET, COATED ORAL DAILY
Qty: 90 TABLET | Refills: 3 | Status: SHIPPED | OUTPATIENT
Start: 2024-04-08 | End: 2025-04-08

## 2024-04-18 ENCOUNTER — TELEPHONE (OUTPATIENT)
Dept: PRIMARY CARE | Facility: CLINIC | Age: 69
End: 2024-04-18
Payer: MEDICARE

## 2024-04-18 DIAGNOSIS — G47.00 INSOMNIA, UNSPECIFIED TYPE: ICD-10-CM

## 2024-04-18 RX ORDER — TRAZODONE HYDROCHLORIDE 50 MG/1
TABLET ORAL
Qty: 135 TABLET | Refills: 3 | Status: SHIPPED | OUTPATIENT
Start: 2024-04-18

## 2024-04-18 NOTE — TELEPHONE ENCOUNTER
----- Message from Rhoda Samuel on behalf of Ousmane Samuel sent at 4/18/2024 11:49 AM EDT -----  Regarding: Refill for Bill   Contact: [phone]  Morning, Mason,    Can I get a refill on Tung’s Trazadone - he does 75 mg @ bedtime.  [facility] in [place],  is 1955.    As always, thanks!  Rhoda

## 2024-04-19 ENCOUNTER — TELEPHONE (OUTPATIENT)
Dept: PRIMARY CARE | Facility: CLINIC | Age: 69
End: 2024-04-19
Payer: MEDICARE

## 2024-04-19 DIAGNOSIS — F33.41 RECURRENT MAJOR DEPRESSIVE DISORDER, IN PARTIAL REMISSION (CMS-HCC): ICD-10-CM

## 2024-04-19 RX ORDER — VILAZODONE HYDROCHLORIDE 40 MG/1
40 TABLET ORAL DAILY
Qty: 30 TABLET | Refills: 11 | Status: SHIPPED | OUTPATIENT
Start: 2024-04-19 | End: 2025-04-14

## 2024-04-19 NOTE — TELEPHONE ENCOUNTER
----- Message from Rhoda Samuel on behalf of Ousmane Samuel sent at 4/19/2024 11:24 AM EDT -----  Regarding: Refill for Bill  Contact: [phone]  Pavan Cuevas,    [facility] just called and said they couldn’t refill Bill’s Viibryd without a phone call from your office - we’ve got six pills left, but if you could contact them and verify that for a refill, I would be grateful!    Thanks,  Rhoda

## 2024-05-14 ENCOUNTER — OFFICE VISIT (OUTPATIENT)
Dept: NEUROLOGY | Facility: HOSPITAL | Age: 69
End: 2024-05-14
Payer: MEDICARE

## 2024-05-14 VITALS
RESPIRATION RATE: 18 BRPM | DIASTOLIC BLOOD PRESSURE: 73 MMHG | WEIGHT: 171 LBS | BODY MASS INDEX: 26.84 KG/M2 | TEMPERATURE: 96.9 F | SYSTOLIC BLOOD PRESSURE: 125 MMHG | HEART RATE: 55 BPM | HEIGHT: 67 IN

## 2024-05-14 DIAGNOSIS — G31.83 DEMENTIA, LEWY BODY WITH BEHAVIOR DISTURBANCE (MULTI): ICD-10-CM

## 2024-05-14 DIAGNOSIS — F02.818 DEMENTIA, LEWY BODY WITH BEHAVIOR DISTURBANCE (MULTI): ICD-10-CM

## 2024-05-14 DIAGNOSIS — Z76.0 MEDICATION REFILL: ICD-10-CM

## 2024-05-14 DIAGNOSIS — F03.B18 MODERATE DEMENTIA WITH BEHAVIORAL DISTURBANCE (MULTI): Primary | ICD-10-CM

## 2024-05-14 PROCEDURE — 3074F SYST BP LT 130 MM HG: CPT | Performed by: PSYCHIATRY & NEUROLOGY

## 2024-05-14 PROCEDURE — 99214 OFFICE O/P EST MOD 30 MIN: CPT | Performed by: PSYCHIATRY & NEUROLOGY

## 2024-05-14 PROCEDURE — 1126F AMNT PAIN NOTED NONE PRSNT: CPT | Performed by: PSYCHIATRY & NEUROLOGY

## 2024-05-14 PROCEDURE — 1157F ADVNC CARE PLAN IN RCRD: CPT | Performed by: PSYCHIATRY & NEUROLOGY

## 2024-05-14 PROCEDURE — 3078F DIAST BP <80 MM HG: CPT | Performed by: PSYCHIATRY & NEUROLOGY

## 2024-05-14 RX ORDER — QUETIAPINE FUMARATE 50 MG/1
50 TABLET, FILM COATED ORAL 2 TIMES DAILY
Qty: 180 TABLET | Refills: 3 | Status: SHIPPED | OUTPATIENT
Start: 2024-05-14 | End: 2025-05-09

## 2024-05-14 ASSESSMENT — PAIN SCALES - GENERAL: PAINLEVEL: 0-NO PAIN

## 2024-05-14 NOTE — PATIENT INSTRUCTIONS
You were seen today by Dr. Bishop.  It is a pleasure seeing you.    Diagnosis:     1. Probable dementia with Lewy bodies (DLB)  2. depression and anxiety, by history, in remission     PLAN:     1. Continue donepezil 10 mg daily.  Potential side effects include runny nose, nausea, gastric upset and slower heart rate.     2. Continue quetiapine 50 mg twice daily,   Extra 1/2 tab can be used up to twice daily as needed if hallucinations not well-controlled  Potential side effects include dizziness, fatigue, dry mouth and constipation     3. Continue gabapentin 300 mg at bedtime.     4. Continue trazodone 75 mg at bedtime     5. We discussed measures of a safe sleep environment in details, try melatonin 5 mg 30 minutes before bedtime        6. For the lightheadedness, this may be due to your medication. We recommend adequate hydration, please check his blood pressure at the time to make sure it is not running low.     7. I recommend to continue not to drive     8. Follow up in 6 months or earlier if needed.      Please call [phone] in case of any questions.        The FDA risks of the cholinesterase inhibitors and specifically rivastigmine including bradycardia and hypotension which could lead to significant mortality and or morbidity was discussed. In the case of donepezil and galantamine we discussed the risk of dangerous dysrhythmias that can be associated with morbidity and mortality when combined with antidepressants and or antipsychotics. was clearly able to recite back risks and benefits as well as alternatives as discussed with you today.  We discussed the potential risk of serotonin syndrome which include fever, hallucinations, confusion, myoclonus, extreme changes in blood pressure and increased heart rate from combining SSRI and trazodone.     General brain health guidelines:  - make sure your other medical conditions are well controlled (e.g., high blood pressure, high cholesterol, diabetes, sleep  apnea etc)  - do not smoke or chew tobacco  - address any sensory deficits (e.g., proper glasses for poor eyesight, hearing aids for hearing loss)  - use a weekly pill box  - eat a heart healthy diet (e.g., lots of fruits and vegetables, low fat and cholesterol)  - exercise at least four days per week, 30 minutes at a time at an intensity that would make it difficult to converse with someone   - make sure you are getting at least 7 hours of quality sleep per night  - keep yourself mentally active daily by reading, playing cards, doing word searches, puzzles, etc.  - stay socially active by being part of a group or organization.
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
## 2024-06-19 ENCOUNTER — APPOINTMENT (OUTPATIENT)
Dept: PRIMARY CARE | Facility: CLINIC | Age: 69
End: 2024-06-19
Payer: MEDICARE

## 2024-06-19 VITALS
OXYGEN SATURATION: 97 % | HEART RATE: 57 BPM | TEMPERATURE: 98.6 F | HEIGHT: 67 IN | DIASTOLIC BLOOD PRESSURE: 62 MMHG | SYSTOLIC BLOOD PRESSURE: 130 MMHG | WEIGHT: 163 LBS | BODY MASS INDEX: 25.58 KG/M2

## 2024-06-19 DIAGNOSIS — G25.81 RESTLESS LEGS SYNDROME: ICD-10-CM

## 2024-06-19 DIAGNOSIS — D50.0 IRON DEFICIENCY ANEMIA SECONDARY TO BLOOD LOSS (CHRONIC): ICD-10-CM

## 2024-06-19 DIAGNOSIS — E03.9 HYPOTHYROIDISM, UNSPECIFIED TYPE: ICD-10-CM

## 2024-06-19 DIAGNOSIS — F02.818 DEMENTIA, LEWY BODY WITH BEHAVIOR DISTURBANCE (MULTI): ICD-10-CM

## 2024-06-19 DIAGNOSIS — Z00.00 MEDICARE ANNUAL WELLNESS VISIT, SUBSEQUENT: Primary | ICD-10-CM

## 2024-06-19 DIAGNOSIS — E55.9 VITAMIN D DEFICIENCY: ICD-10-CM

## 2024-06-19 DIAGNOSIS — G31.83 DEMENTIA, LEWY BODY WITH BEHAVIOR DISTURBANCE (MULTI): ICD-10-CM

## 2024-06-19 DIAGNOSIS — E78.2 MIXED HYPERLIPIDEMIA: ICD-10-CM

## 2024-06-19 DIAGNOSIS — F33.41 RECURRENT MAJOR DEPRESSIVE DISORDER, IN PARTIAL REMISSION (CMS-HCC): ICD-10-CM

## 2024-06-19 DIAGNOSIS — G47.00 INSOMNIA, UNSPECIFIED TYPE: ICD-10-CM

## 2024-06-19 DIAGNOSIS — I25.10 CORONARY ARTERY DISEASE INVOLVING NATIVE HEART, UNSPECIFIED VESSEL OR LESION TYPE, UNSPECIFIED WHETHER ANGINA PRESENT: ICD-10-CM

## 2024-06-19 DIAGNOSIS — N40.0 BENIGN PROSTATIC HYPERPLASIA WITHOUT LOWER URINARY TRACT SYMPTOMS: ICD-10-CM

## 2024-06-19 DIAGNOSIS — I10 BENIGN ESSENTIAL HYPERTENSION: ICD-10-CM

## 2024-06-19 DIAGNOSIS — E03.8 OTHER SPECIFIED HYPOTHYROIDISM: ICD-10-CM

## 2024-06-19 PROCEDURE — 1159F MED LIST DOCD IN RCRD: CPT | Performed by: FAMILY MEDICINE

## 2024-06-19 PROCEDURE — 1170F FXNL STATUS ASSESSED: CPT | Performed by: FAMILY MEDICINE

## 2024-06-19 PROCEDURE — 1036F TOBACCO NON-USER: CPT | Performed by: FAMILY MEDICINE

## 2024-06-19 PROCEDURE — 1123F ACP DISCUSS/DSCN MKR DOCD: CPT | Performed by: FAMILY MEDICINE

## 2024-06-19 PROCEDURE — 1160F RVW MEDS BY RX/DR IN RCRD: CPT | Performed by: FAMILY MEDICINE

## 2024-06-19 PROCEDURE — 3078F DIAST BP <80 MM HG: CPT | Performed by: FAMILY MEDICINE

## 2024-06-19 PROCEDURE — 1157F ADVNC CARE PLAN IN RCRD: CPT | Performed by: FAMILY MEDICINE

## 2024-06-19 PROCEDURE — 1158F ADVNC CARE PLAN TLK DOCD: CPT | Performed by: FAMILY MEDICINE

## 2024-06-19 PROCEDURE — 99213 OFFICE O/P EST LOW 20 MIN: CPT | Performed by: FAMILY MEDICINE

## 2024-06-19 PROCEDURE — 3075F SYST BP GE 130 - 139MM HG: CPT | Performed by: FAMILY MEDICINE

## 2024-06-19 PROCEDURE — G0439 PPPS, SUBSEQ VISIT: HCPCS | Performed by: FAMILY MEDICINE

## 2024-06-19 RX ORDER — BUSPIRONE HYDROCHLORIDE 7.5 MG/1
TABLET ORAL
COMMUNITY
Start: 2021-12-20 | End: 2024-06-19 | Stop reason: WASHOUT

## 2024-06-19 RX ORDER — ESCITALOPRAM OXALATE 20 MG/1
TABLET ORAL
COMMUNITY
End: 2024-06-19 | Stop reason: WASHOUT

## 2024-06-19 RX ORDER — ALPRAZOLAM 0.5 MG/1
TABLET ORAL
COMMUNITY
End: 2024-06-19 | Stop reason: WASHOUT

## 2024-06-19 RX ORDER — CICLOPIROX 80 MG/ML
SOLUTION TOPICAL
COMMUNITY

## 2024-06-19 RX ORDER — PRAMIPEXOLE DIHYDROCHLORIDE 0.5 MG/1
TABLET ORAL
COMMUNITY
End: 2024-06-19 | Stop reason: WASHOUT

## 2024-06-19 ASSESSMENT — PATIENT HEALTH QUESTIONNAIRE - PHQ9
2. FEELING DOWN, DEPRESSED OR HOPELESS: NEARLY EVERY DAY
1. LITTLE INTEREST OR PLEASURE IN DOING THINGS: NEARLY EVERY DAY
SUM OF ALL RESPONSES TO PHQ9 QUESTIONS 1 AND 2: 6

## 2024-06-19 ASSESSMENT — ACTIVITIES OF DAILY LIVING (ADL)
DOING_HOUSEWORK: NEEDS ASSISTANCE
MANAGING_FINANCES: NEEDS ASSISTANCE
BATHING: INDEPENDENT
TAKING_MEDICATION: NEEDS ASSISTANCE
GROCERY_SHOPPING: NEEDS ASSISTANCE
DRESSING: INDEPENDENT

## 2024-06-19 NOTE — PROGRESS NOTES
"Subjective   Reason for Visit: Ousmane Samuel is an 68 y.o. male here for a Medicare Wellness visit.     Past Medical, Surgical, and Family History reviewed and updated in chart.    Reviewed all medications by prescribing practitioner or clinical pharmacist (such as prescriptions, OTCs, herbal therapies and supplements) and documented in the medical record.    HPI  Reviewed Chronic illnesess  Patient Care Team:  Mason Michael DO as PCP - General  Mason Michael DO as PCP - [facility] ACO Attributed Provider     Review of Systems    Objective   Vitals:  /62 (BP Location: Left arm, Patient Position: Sitting, BP Cuff Size: Adult)   Pulse 57   Temp 37 °C (98.6 °F)   Ht 1.702 m (5' 7\")   Wt 73.9 kg (163 lb)   SpO2 97%   BMI 25.53 kg/m²       Physical Exam  General: Patient is alert and oriented Ãƒâ€”3 and appears in no acute distress. No respiratory distress.     Head: Atraumatic normocephalic.     Eyes: EOMI, PERRLA      Neck: Normal range of motion, no masses. Thyroid is palpable and normal in size without any nodules. No anterior cervical or posterior cervical adenopathy.     Heart: Regular rate and rhythm, every 3rd beat was a dropped beat which the patient states is his normal, no murmurs clicks or gallops     Lungs: Clear to auscultation bilaterally without any rhonchi rales or wheezing, lung sounds heard throughout all lung fields     Abdomen: Soft, nontender, no rigidity, rebound, guarding or organomegaly. Bowel sounds Ãƒâ€”4 quadrants.     Musculoskeletal: Normal range of motion, strength is grossly intact in the proximal distal muscles of the upper and lower extremities bilaterally, deep tendon reflexes +2 out of 4 and symmetric bilaterally at the patella, Achilles, biceps, triceps, sensation intact.     Nerves: Cranial nerves II through XII appear grossly intact and without deficit     Skin: Intact, dry, no rashes or erythema     Psych: Flat affect   Assessment/Plan   Problem List Items Addressed This " Visit       Benign essential hypertension    Relevant Orders    CBC and Auto Differential    Comprehensive Metabolic Panel    Lipid Panel    TSH with reflex to Free T4 if abnormal    Vitamin B12    Vitamin D 25-Hydroxy,Total (for eval of Vitamin D levels)    Ferritin    Dementia, Lewy body with behavior disturbance (Multi)    Hypothyroidism    Relevant Orders    CBC and Auto Differential    Comprehensive Metabolic Panel    Lipid Panel    TSH with reflex to Free T4 if abnormal    Vitamin B12    Vitamin D 25-Hydroxy,Total (for eval of Vitamin D levels)    Ferritin    Iron deficiency anemia secondary to blood loss (chronic)    Relevant Orders    CBC and Auto Differential    Comprehensive Metabolic Panel    Lipid Panel    TSH with reflex to Free T4 if abnormal    Vitamin B12    Vitamin D 25-Hydroxy,Total (for eval of Vitamin D levels)    Ferritin    Mixed hyperlipidemia    Relevant Orders    CBC and Auto Differential    Comprehensive Metabolic Panel    Lipid Panel    TSH with reflex to Free T4 if abnormal    Vitamin B12    Vitamin D 25-Hydroxy,Total (for eval of Vitamin D levels)    Ferritin    Restless legs syndrome    Vitamin D deficiency    Relevant Orders    CBC and Auto Differential    Comprehensive Metabolic Panel    Lipid Panel    TSH with reflex to Free T4 if abnormal    Vitamin B12    Vitamin D 25-Hydroxy,Total (for eval of Vitamin D levels)    Ferritin     Other Visit Diagnoses       Medicare annual wellness visit, subsequent    -  Primary    Coronary artery disease involving native heart, unspecified vessel or lesion type, unspecified whether angina present        Insomnia, unspecified type        Recurrent major depressive disorder, in partial remission (CMS-HCC)        Benign prostatic hyperplasia without lower urinary tract symptoms        Relevant Orders    PSA             Discussed depression and anxiety-stable  -Viibryd 40 mg daily     Coronary artery disease with history of stents-stable  Previous  doctor was Dr. Russell Currently stable     Expressions dementia with behavioral disturbances secondary to Lewy body dementia-worsening  Neurologist is Dr. Bishop     Hyperlipidemia  Controlled on Zetia 10 mg 1 p.o. daily and rosuvastatin 40 mg 1 p.o. daily     Hypothyroidism  Controlled on levothyroxine 175 mcg daily     Essential hypertension  Controlled on losartan 100 mg daily     RLS  - Stopped Requip and hallucinations are improving.   - Gabapentin is helping at 300 mg   - Trazodone and Melatonin for the sleep  - Repeat ferritin to see if need to take any iron still      - Colonoscopy 2023 was normal.  Cologuard in  2028  - PSA ordered.  Normal 2023.   - No night sweats/ appetite changes/ abnormal lumps     Labs in 6 months         ANGEL

## 2024-06-26 ENCOUNTER — LAB (OUTPATIENT)
Dept: LAB | Facility: LAB | Age: 69
End: 2024-06-26
Payer: MEDICARE

## 2024-06-26 DIAGNOSIS — E55.9 VITAMIN D DEFICIENCY: ICD-10-CM

## 2024-06-26 DIAGNOSIS — D50.0 IRON DEFICIENCY ANEMIA SECONDARY TO BLOOD LOSS (CHRONIC): ICD-10-CM

## 2024-06-26 DIAGNOSIS — E03.8 OTHER SPECIFIED HYPOTHYROIDISM: ICD-10-CM

## 2024-06-26 DIAGNOSIS — N40.0 BENIGN PROSTATIC HYPERPLASIA WITHOUT LOWER URINARY TRACT SYMPTOMS: ICD-10-CM

## 2024-06-26 DIAGNOSIS — E03.9 HYPOTHYROIDISM, UNSPECIFIED TYPE: ICD-10-CM

## 2024-06-26 DIAGNOSIS — E78.2 MIXED HYPERLIPIDEMIA: ICD-10-CM

## 2024-06-26 DIAGNOSIS — I10 BENIGN ESSENTIAL HYPERTENSION: ICD-10-CM

## 2024-06-26 LAB
25(OH)D3 SERPL-MCNC: 38 NG/ML (ref 30–100)
ALBUMIN SERPL BCP-MCNC: 4.5 G/DL (ref 3.4–5)
ALP SERPL-CCNC: 48 U/L (ref 33–136)
ALT SERPL W P-5'-P-CCNC: 22 U/L (ref 10–52)
ANION GAP SERPL CALC-SCNC: 13 MMOL/L (ref 10–20)
AST SERPL W P-5'-P-CCNC: 18 U/L (ref 9–39)
BASOPHILS # BLD AUTO: 0.02 X10*3/UL (ref 0–0.1)
BASOPHILS NFR BLD AUTO: 0.3 %
BILIRUB SERPL-MCNC: 0.5 MG/DL (ref 0–1.2)
BUN SERPL-MCNC: 20 MG/DL (ref 6–23)
CALCIUM SERPL-MCNC: 9.4 MG/DL (ref 8.6–10.3)
CHLORIDE SERPL-SCNC: 107 MMOL/L (ref 98–107)
CHOLEST SERPL-MCNC: 131 MG/DL (ref 0–199)
CHOLESTEROL/HDL RATIO: 2.4
CO2 SERPL-SCNC: 27 MMOL/L (ref 21–32)
CREAT SERPL-MCNC: 0.89 MG/DL (ref 0.5–1.3)
EGFRCR SERPLBLD CKD-EPI 2021: >90 ML/MIN/1.73M*2
EOSINOPHIL # BLD AUTO: 0.13 X10*3/UL (ref 0–0.7)
EOSINOPHIL NFR BLD AUTO: 2.2 %
ERYTHROCYTE [DISTWIDTH] IN BLOOD BY AUTOMATED COUNT: 11.9 % (ref 11.5–14.5)
FERRITIN SERPL-MCNC: 36 NG/ML (ref 20–300)
GLUCOSE SERPL-MCNC: 102 MG/DL (ref 74–99)
HCT VFR BLD AUTO: 42.5 % (ref 41–52)
HDLC SERPL-MCNC: 54.2 MG/DL
HGB BLD-MCNC: 13.9 G/DL (ref 13.5–17.5)
IMM GRANULOCYTES # BLD AUTO: 0.01 X10*3/UL (ref 0–0.7)
IMM GRANULOCYTES NFR BLD AUTO: 0.2 % (ref 0–0.9)
LDLC SERPL CALC-MCNC: 54 MG/DL
LYMPHOCYTES # BLD AUTO: 1.1 X10*3/UL (ref 1.2–4.8)
LYMPHOCYTES NFR BLD AUTO: 18.6 %
MCH RBC QN AUTO: 30.8 PG (ref 26–34)
MCHC RBC AUTO-ENTMCNC: 32.7 G/DL (ref 32–36)
MCV RBC AUTO: 94 FL (ref 80–100)
MONOCYTES # BLD AUTO: 0.8 X10*3/UL (ref 0.1–1)
MONOCYTES NFR BLD AUTO: 13.6 %
NEUTROPHILS # BLD AUTO: 3.84 X10*3/UL (ref 1.2–7.7)
NEUTROPHILS NFR BLD AUTO: 65.1 %
NON HDL CHOLESTEROL: 77 MG/DL (ref 0–149)
NRBC BLD-RTO: 0 /100 WBCS (ref 0–0)
PLATELET # BLD AUTO: 152 X10*3/UL (ref 150–450)
POTASSIUM SERPL-SCNC: 4 MMOL/L (ref 3.5–5.3)
PROT SERPL-MCNC: 6.3 G/DL (ref 6.4–8.2)
PSA SERPL-MCNC: 0.83 NG/ML
RBC # BLD AUTO: 4.51 X10*6/UL (ref 4.5–5.9)
SODIUM SERPL-SCNC: 143 MMOL/L (ref 136–145)
T4 FREE SERPL-MCNC: 0.65 NG/DL (ref 0.61–1.12)
TRIGL SERPL-MCNC: 116 MG/DL (ref 0–149)
TSH SERPL-ACNC: 4.98 MIU/L (ref 0.44–3.98)
VIT B12 SERPL-MCNC: 607 PG/ML (ref 211–911)
VLDL: 23 MG/DL (ref 0–40)
WBC # BLD AUTO: 5.9 X10*3/UL (ref 4.4–11.3)

## 2024-06-26 PROCEDURE — 84443 ASSAY THYROID STIM HORMONE: CPT

## 2024-06-26 PROCEDURE — 36415 COLL VENOUS BLD VENIPUNCTURE: CPT

## 2024-06-26 PROCEDURE — 82728 ASSAY OF FERRITIN: CPT

## 2024-06-26 PROCEDURE — 85025 COMPLETE CBC W/AUTO DIFF WBC: CPT

## 2024-06-26 PROCEDURE — 84153 ASSAY OF PSA TOTAL: CPT

## 2024-06-26 PROCEDURE — 80053 COMPREHEN METABOLIC PANEL: CPT

## 2024-06-26 PROCEDURE — 82607 VITAMIN B-12: CPT

## 2024-06-26 PROCEDURE — 80061 LIPID PANEL: CPT

## 2024-06-26 PROCEDURE — 84439 ASSAY OF FREE THYROXINE: CPT

## 2024-06-26 PROCEDURE — 82306 VITAMIN D 25 HYDROXY: CPT

## 2024-06-29 RX ORDER — LEVOTHYROXINE SODIUM 200 UG/1
200 TABLET ORAL DAILY
Qty: 90 TABLET | Refills: 3 | Status: SHIPPED | OUTPATIENT
Start: 2024-06-29 | End: 2025-06-29

## 2024-06-29 NOTE — RESULT ENCOUNTER NOTE
Pleease let the family know that the labs were normal except for the Thysroid was low and the Ferritin was less than 50  We will increase the thyroid medicaiton and he should continue an iron supplement.  Jeff castañeda in 6 weeks

## 2024-07-01 ENCOUNTER — TELEPHONE (OUTPATIENT)
Dept: PRIMARY CARE | Facility: CLINIC | Age: 69
End: 2024-07-01
Payer: MEDICARE

## 2024-07-01 NOTE — TELEPHONE ENCOUNTER
Pt wife called, said they are going out of town on Friday and will be gone a few days. Tung is having increasing anxiety and paranoia about it. Neurologist suggested possible increasing the seroquel to help. Wife wanted to know your thoughts and if so, when should the increase start, and what should he increase to?

## 2024-09-10 DIAGNOSIS — F32.A ANXIETY AND DEPRESSION: ICD-10-CM

## 2024-09-10 DIAGNOSIS — F41.9 ANXIETY AND DEPRESSION: ICD-10-CM

## 2024-09-10 RX ORDER — BUPROPION HYDROCHLORIDE 200 MG/1
200 TABLET, EXTENDED RELEASE ORAL EVERY 12 HOURS
Qty: 180 TABLET | Refills: 3 | Status: SHIPPED | OUTPATIENT
Start: 2024-09-10 | End: 2025-09-10

## 2024-09-23 ENCOUNTER — TELEPHONE (OUTPATIENT)
Dept: PRIMARY CARE | Facility: CLINIC | Age: 69
End: 2024-09-23
Payer: MEDICARE

## 2024-09-23 DIAGNOSIS — I25.10 CORONARY ARTERY DISEASE INVOLVING NATIVE HEART, UNSPECIFIED VESSEL OR LESION TYPE, UNSPECIFIED WHETHER ANGINA PRESENT: Primary | ICD-10-CM

## 2024-09-23 RX ORDER — CLOPIDOGREL BISULFATE 75 MG/1
75 TABLET ORAL DAILY
Qty: 90 TABLET | Refills: 3 | Status: SHIPPED | OUTPATIENT
Start: 2024-09-23 | End: 2025-09-23

## 2024-11-15 ENCOUNTER — OFFICE VISIT (OUTPATIENT)
Dept: NEUROLOGY | Facility: HOSPITAL | Age: 69
End: 2024-11-15
Payer: MEDICARE

## 2024-11-15 VITALS
SYSTOLIC BLOOD PRESSURE: 123 MMHG | WEIGHT: 174 LBS | HEART RATE: 57 BPM | BODY MASS INDEX: 27.31 KG/M2 | DIASTOLIC BLOOD PRESSURE: 79 MMHG | HEIGHT: 67 IN

## 2024-11-15 DIAGNOSIS — G47.00 INSOMNIA, UNSPECIFIED TYPE: ICD-10-CM

## 2024-11-15 DIAGNOSIS — Z76.0 MEDICATION REFILL: ICD-10-CM

## 2024-11-15 DIAGNOSIS — F03.918 DEMENTIA WITH OTHER BEHAVIORAL DISTURBANCE, UNSPECIFIED DEMENTIA SEVERITY, UNSPECIFIED DEMENTIA TYPE: ICD-10-CM

## 2024-11-15 PROCEDURE — 99417 PROLNG OP E/M EACH 15 MIN: CPT | Performed by: PSYCHIATRY & NEUROLOGY

## 2024-11-15 PROCEDURE — 99214 OFFICE O/P EST MOD 30 MIN: CPT | Performed by: PSYCHIATRY & NEUROLOGY

## 2024-11-15 PROCEDURE — 3078F DIAST BP <80 MM HG: CPT | Performed by: PSYCHIATRY & NEUROLOGY

## 2024-11-15 PROCEDURE — 1123F ACP DISCUSS/DSCN MKR DOCD: CPT | Performed by: PSYCHIATRY & NEUROLOGY

## 2024-11-15 PROCEDURE — 3074F SYST BP LT 130 MM HG: CPT | Performed by: PSYCHIATRY & NEUROLOGY

## 2024-11-15 PROCEDURE — 1157F ADVNC CARE PLAN IN RCRD: CPT | Performed by: PSYCHIATRY & NEUROLOGY

## 2024-11-15 PROCEDURE — 99214 OFFICE O/P EST MOD 30 MIN: CPT | Mod: GC | Performed by: PSYCHIATRY & NEUROLOGY

## 2024-11-15 PROCEDURE — 1159F MED LIST DOCD IN RCRD: CPT | Performed by: PSYCHIATRY & NEUROLOGY

## 2024-11-15 PROCEDURE — 3008F BODY MASS INDEX DOCD: CPT | Performed by: PSYCHIATRY & NEUROLOGY

## 2024-11-15 RX ORDER — DONEPEZIL HYDROCHLORIDE 10 MG/1
10 TABLET, FILM COATED ORAL DAILY
Qty: 90 TABLET | Refills: 3 | Status: SHIPPED | OUTPATIENT
Start: 2024-11-15 | End: 2025-11-15

## 2024-11-15 RX ORDER — QUETIAPINE FUMARATE 50 MG/1
50 TABLET, FILM COATED ORAL 2 TIMES DAILY
Qty: 180 TABLET | Refills: 3 | Status: SHIPPED | OUTPATIENT
Start: 2024-11-15 | End: 2025-11-10

## 2024-11-15 RX ORDER — TRAZODONE HYDROCHLORIDE 50 MG/1
TABLET ORAL
Qty: 135 TABLET | Refills: 3 | Status: SHIPPED | OUTPATIENT
Start: 2024-11-15

## 2024-11-15 RX ORDER — GABAPENTIN 300 MG/1
300 CAPSULE ORAL NIGHTLY
Qty: 90 CAPSULE | Refills: 3 | Status: SHIPPED | OUTPATIENT
Start: 2024-11-15

## 2024-11-15 NOTE — PATIENT INSTRUCTIONS
You were seen today by Dr. Bishop.  It is a pleasure seeing you.    Given the history and today's evaluation, I suspect Dementia with Lewy Bodiess    PLAN:  - psychiatry referral may be pursued if mood becomes concerning  - continue quetiapine, gabapentin, donepezil and trazodone  - follow up in 6 months    Please call [phone] if you have any questions.

## 2024-11-15 NOTE — PROGRESS NOTES
Subjective   Ousmane Samuel is a 68 y.o. right-handed male.    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 The patient is not able to report any symptoms but his wife was able to fill in the history. He seems down during the exam. While discussing his symptoms he kept saying how his case is sad and he should just be buried.    His wife is in the room and reports the patient's cognition and physical state has declined since February. The patient says he doesn't notice much but his wife does. She reports that his short and long term memory have been going down. The majority of the time the patient will not be making sense on conversations. He is able to continue his ADL's but has not been driving for a while.  As far as his physical decline his wife says he is not necessarily any weaker but he is slower in his movements and reports feeling unsteady at time.    He sees people in the house and hears things. He cannot recall any events but she says all the hallucinations are pleasant thoughts. No thoughts or delusions of hurting himself or others. He is however, paranoid about people taking his things.    There has been some mood changes. She feels he is off and on. He can get frustrated over things that wouldn't frustrate him in the past. His wife feels that he is not finding enjoyment in things anymore.     He is not able to recall anything from yesterday or last week. After prompting from his wife he was able to recall volunteering at a homeless shelter.    Gabapentin 300 mg at bedtime controls RLS.     His sleep has been under control. His wife says his legs are still kicking but it is improved from before.    Objective   Neurological Exam  Physical Exam    Mental Status Examination:  General appearance: Well-groomed, good eye contact,  cooperative  Orientation: Alert and oriented to person, place, and time  Motor: no psychomotor agitation or retardation, stable gait  Speech: regular rate, rhythm, tone, and volume  Mood: down  Affect: stable, depressed  Passive death wish: denies  Suicidal ideation: denies  Thought process: logical, linear, and goal-directed without loosening of associations  Thought content: auditory and visual hallucinations, delusions, and not responding to internal stimuli  Insight/Judgment: good/good  Praxis: Transitive/Intransitive/Orofacial  Fund of knowledge: good  Recent and remote memory: poor  Attention span and concentration: poor  Language: normal receptive and expressive language without paraphrasic errors    [facility]- [facility] Cognitive Assessment (  11/  15/ 2024) : 10/30   Visuospatial / Executive: 3/5  Namin/3  Read List of Digits: 2/2  Read List of Letters: 1/1  Serial Sevens: 0/3   Language Repeat: 2/2   Language Fluency: 0/1   Abstraction: 0/2   Delayed Recall: 0/5   Orientation: 0/6  Education:  Medical Doctor    NEUROLOGICAL EXAMINATION    Opthalmoscopic:   Normal.  Fundi were well visualized with normal disc margins, clear vessels, and vascular pulsations, No disc edema.  The cup/disk ratio was not enlarged.  No hemorrhages or exudates were present in the posterior segments that were visualized.    Cranial nerves:  CN II: visual fields full to confrontation  CN III, IV, VI: Pupils round, reactive to light and accommodation.  Lids symmetric.  No ptosis.  Extra-occular muscles are intact with normal alignment.  No nystagmus  CN V: Facial sensation intact bilaterally.    CN VII: Normal and symmetric facial strength.  Nasolabial folds symmetric  CN VIII: Hearing intact to finger rub  CN IX: Palate elevates symmetrically.  CN XI: Normal shoulder shrug and neck turning  CN XII: Tongue midline, with normal bulk and strength, no fasciculations      Motor:  Muscle bulk was normal in all extremities.  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 His cognition has declined since last visit. He is not able to recall any events in the past. He has also become more tangential. Throughout the exam he was making jokes about ending his life as he feels \"useless\".    Plan:  - Continue quetiapine 50 mg, Gabapentin 300 mg, Donepezil 10 mg, trazodone 50 mg   - Pt offered psychiatry referral, wife believes she is able to control his mood at this point. Can pursue when family is agreeable.  - Follow up in 6 months    "

## 2024-12-10 ENCOUNTER — APPOINTMENT (OUTPATIENT)
Dept: PRIMARY CARE | Facility: CLINIC | Age: 69
End: 2024-12-10
Payer: MEDICARE

## 2024-12-10 VITALS
HEIGHT: 67 IN | OXYGEN SATURATION: 93 % | DIASTOLIC BLOOD PRESSURE: 72 MMHG | BODY MASS INDEX: 27.25 KG/M2 | SYSTOLIC BLOOD PRESSURE: 122 MMHG | RESPIRATION RATE: 16 BRPM | HEART RATE: 55 BPM

## 2024-12-10 DIAGNOSIS — G31.83 DEMENTIA, LEWY BODY WITH BEHAVIOR DISTURBANCE (MULTI): ICD-10-CM

## 2024-12-10 DIAGNOSIS — E78.2 MIXED HYPERLIPIDEMIA: ICD-10-CM

## 2024-12-10 DIAGNOSIS — I10 BENIGN ESSENTIAL HYPERTENSION: ICD-10-CM

## 2024-12-10 DIAGNOSIS — E03.8 OTHER SPECIFIED HYPOTHYROIDISM: ICD-10-CM

## 2024-12-10 DIAGNOSIS — G47.00 INSOMNIA, UNSPECIFIED TYPE: ICD-10-CM

## 2024-12-10 DIAGNOSIS — D50.0 IRON DEFICIENCY ANEMIA SECONDARY TO BLOOD LOSS (CHRONIC): ICD-10-CM

## 2024-12-10 DIAGNOSIS — F02.818 DEMENTIA, LEWY BODY WITH BEHAVIOR DISTURBANCE (MULTI): ICD-10-CM

## 2024-12-10 DIAGNOSIS — G25.81 RESTLESS LEGS SYNDROME: ICD-10-CM

## 2024-12-10 DIAGNOSIS — Z23 ENCOUNTER FOR IMMUNIZATION: Primary | ICD-10-CM

## 2024-12-10 DIAGNOSIS — F33.41 RECURRENT MAJOR DEPRESSIVE DISORDER, IN PARTIAL REMISSION (CMS-HCC): ICD-10-CM

## 2024-12-10 DIAGNOSIS — E55.9 VITAMIN D DEFICIENCY: ICD-10-CM

## 2024-12-10 DIAGNOSIS — I25.10 CORONARY ARTERY DISEASE INVOLVING NATIVE HEART, UNSPECIFIED VESSEL OR LESION TYPE, UNSPECIFIED WHETHER ANGINA PRESENT: ICD-10-CM

## 2024-12-10 PROCEDURE — 90662 IIV NO PRSV INCREASED AG IM: CPT | Performed by: FAMILY MEDICINE

## 2024-12-10 PROCEDURE — 1036F TOBACCO NON-USER: CPT | Performed by: FAMILY MEDICINE

## 2024-12-10 PROCEDURE — 90677 PCV20 VACCINE IM: CPT | Performed by: FAMILY MEDICINE

## 2024-12-10 PROCEDURE — 1159F MED LIST DOCD IN RCRD: CPT | Performed by: FAMILY MEDICINE

## 2024-12-10 PROCEDURE — G0009 ADMIN PNEUMOCOCCAL VACCINE: HCPCS | Performed by: FAMILY MEDICINE

## 2024-12-10 PROCEDURE — 3078F DIAST BP <80 MM HG: CPT | Performed by: FAMILY MEDICINE

## 2024-12-10 PROCEDURE — 1123F ACP DISCUSS/DSCN MKR DOCD: CPT | Performed by: FAMILY MEDICINE

## 2024-12-10 PROCEDURE — 99213 OFFICE O/P EST LOW 20 MIN: CPT | Performed by: FAMILY MEDICINE

## 2024-12-10 PROCEDURE — G0008 ADMIN INFLUENZA VIRUS VAC: HCPCS | Performed by: FAMILY MEDICINE

## 2024-12-10 PROCEDURE — 3074F SYST BP LT 130 MM HG: CPT | Performed by: FAMILY MEDICINE

## 2024-12-10 PROCEDURE — 99397 PER PM REEVAL EST PAT 65+ YR: CPT | Performed by: FAMILY MEDICINE

## 2024-12-10 PROCEDURE — 1160F RVW MEDS BY RX/DR IN RCRD: CPT | Performed by: FAMILY MEDICINE

## 2024-12-10 PROCEDURE — 1157F ADVNC CARE PLAN IN RCRD: CPT | Performed by: FAMILY MEDICINE

## 2024-12-10 NOTE — PROGRESS NOTES
"Subjective   Reason for Visit: Ousmane Samuel is an 69 y.o. male here for an annual physical exam and review of chronic medical conditions    Past Medical, Surgical, and Family History reviewed and updated in chart.    Reviewed all medications by prescribing practitioner or clinical pharmacist (such as prescriptions, OTCs, herbal therapies and supplements) and documented in the medical record.    HPI  Reviewed Chronic illnesses    Diet : [employer] DIET  Water intake minimum  Exercise:  going to start.   Vision- regular  Dental:  No sore or issues with teeth  Hearing- No loss of hearing.  Still some auditory hallucinations    Patient Care Team:  Mason Michael DO as PCP - General  Mason Michael DO as PCP - [facility] Attributed Provider     Review of Systems  No other complaints  Objective   Vitals:  /72 (BP Location: Right arm, Patient Position: Sitting, BP Cuff Size: Adult)   Pulse 55   Resp 16   Ht 1.702 m (5' 7\")   SpO2 93%   BMI 27.25 kg/m²       Physical Exam  General: Patient is alert and appears in no acute distress. No respiratory distress.     Head: Atraumatic normocephalic.     Eyes: EOMI, PERRLA      Neck: Normal range of motion, no masses. Thyroid is palpable and normal in size without any nodules. No anterior cervical or posterior cervical adenopathy.     Heart: Regular rate and rhythm, every 3rd beat was a dropped beat which the patient states is his normal, no murmurs clicks or gallops     Lungs: Clear to auscultation bilaterally without any rhonchi rales or wheezing, lung sounds heard throughout all lung fields     Abdomen: Soft, nontender, no rigidity, rebound, guarding or organomegaly. Bowel sounds Ãƒâ€”4 quadrants.     Musculoskeletal: Normal range of motion, strength is grossly intact in the proximal distal muscles of the upper and lower extremities bilaterally, deep tendon reflexes +2 out of 4 and symmetric bilaterally at the patella, Achilles, biceps, triceps, sensation " intact.     Nerves: Cranial nerves II through XII appear grossly intact and without deficit     Skin: Intact, dry, no rashes or erythema     Psych: Flat affect   Assessment/Plan   Problem List Items Addressed This Visit       Benign essential hypertension    Dementia, Lewy body with behavior disturbance (Multi)    Hypothyroidism    Iron deficiency anemia secondary to blood loss (chronic)    Mixed hyperlipidemia    Restless legs syndrome    Vitamin D deficiency     Other Visit Diagnoses       Encounter for immunization    -  Primary    Relevant Orders    Flu vaccine, trivalent, preservative free, HIGH-DOSE, age 65y+ (Fluzone)    Coronary artery disease involving native heart, unspecified vessel or lesion type, unspecified whether angina present        Insomnia, unspecified type        Recurrent major depressive disorder, in partial remission (CMS-HCC)            The patient is a 69-year-old male who does have suspected Lewy body dementia    Discussed depression and anxiety-stable  -Viibryd 40 mg daily     Coronary artery disease with history of stents-stable  Previous doctor was Dr. Russell Currently stable     Expressions dementia with behavioral disturbances secondary to Lewy body dementia-worsening  Neurologist is Dr. Bishop  - 10/30     Hyperlipidemia  Controlled on Zetia 10 mg 1 p.o. daily and rosuvastatin 40 mg 1 p.o. daily     Hypothyroidism  Controlled on levothyroxine 175 mcg daily     Essential hypertension  Controlled on losartan 100 mg daily     RLS  - Stopped Requip and hallucinations are improving.   - Gabapentin is helping at 300 mg   - Trazodone and Melatonin for the sleep  - Repeat ferritin to see if need to take any iron still    Insomnia  Trazodone 75 mg nightly    - Colonoscopy 2023 was normal.  Cologuard in  2028  - PSA ordered.  Normal 2023.   - No night sweats/ appetite changes/ abnormal lumps     Labs in 6 months

## 2025-01-07 DIAGNOSIS — E78.2 MIXED HYPERLIPIDEMIA: ICD-10-CM

## 2025-01-13 RX ORDER — EZETIMIBE 10 MG/1
TABLET ORAL
Qty: 90 TABLET | Refills: 3 | Status: SHIPPED | OUTPATIENT
Start: 2025-01-13

## 2025-02-10 DIAGNOSIS — Z76.0 MEDICATION REFILL: ICD-10-CM

## 2025-02-10 RX ORDER — QUETIAPINE FUMARATE 50 MG/1
50 TABLET, FILM COATED ORAL 2 TIMES DAILY
Qty: 180 TABLET | Refills: 3 | Status: SHIPPED | OUTPATIENT
Start: 2025-02-10 | End: 2026-02-05

## 2025-03-17 ENCOUNTER — TELEPHONE (OUTPATIENT)
Dept: PRIMARY CARE | Facility: CLINIC | Age: 70
End: 2025-03-17
Payer: MEDICARE

## 2025-03-17 NOTE — TELEPHONE ENCOUNTER
Per Dr Fernanda christianson message sent to wife notifying her that it should not be a problem just watch for any bleeding and can continue medications regularly tomorrow.

## 2025-03-17 NOTE — TELEPHONE ENCOUNTER
Pt wife sent [employer] message:  it looks like Bill got into his pill box…is it a problem if he had a double dose of his clopidogrel, viibryd  and CoQ10?     Thanks much,  Rhoda

## 2025-03-19 ENCOUNTER — TELEPHONE (OUTPATIENT)
Dept: PRIMARY CARE | Facility: CLINIC | Age: 70
End: 2025-03-19
Payer: MEDICARE

## 2025-03-19 DIAGNOSIS — I10 PRIMARY HYPERTENSION: ICD-10-CM

## 2025-03-19 DIAGNOSIS — I25.10 CORONARY ARTERY DISEASE INVOLVING NATIVE HEART, UNSPECIFIED VESSEL OR LESION TYPE, UNSPECIFIED WHETHER ANGINA PRESENT: ICD-10-CM

## 2025-03-19 RX ORDER — LOSARTAN POTASSIUM 100 MG/1
100 TABLET ORAL DAILY
Qty: 90 TABLET | Refills: 3 | Status: SHIPPED | OUTPATIENT
Start: 2025-03-19 | End: 2026-03-19

## 2025-03-19 RX ORDER — ROSUVASTATIN CALCIUM 40 MG/1
40 TABLET, COATED ORAL DAILY
Qty: 90 TABLET | Refills: 3 | Status: SHIPPED | OUTPATIENT
Start: 2025-03-19 | End: 2026-03-19

## 2025-03-19 NOTE — TELEPHONE ENCOUNTER
**Copied from wifes Karine Cuevas,    Can I get refills on Bill’s Losartan, 100 mg and rovustatin, 40 mg.  Discount drug in [place], birthday is 1955.    Thanks!  Rhoda

## 2025-04-07 ENCOUNTER — PATIENT MESSAGE (OUTPATIENT)
Dept: PRIMARY CARE | Facility: CLINIC | Age: 70
End: 2025-04-07
Payer: MEDICARE

## 2025-04-07 DIAGNOSIS — G47.00 INSOMNIA, UNSPECIFIED TYPE: ICD-10-CM

## 2025-04-07 RX ORDER — TRAZODONE HYDROCHLORIDE 50 MG/1
TABLET ORAL
Qty: 135 TABLET | Refills: 3 | Status: SHIPPED | OUTPATIENT
Start: 2025-04-07

## 2025-04-21 DIAGNOSIS — F33.41 RECURRENT MAJOR DEPRESSIVE DISORDER, IN PARTIAL REMISSION (CMS-HCC): ICD-10-CM

## 2025-04-21 RX ORDER — VILAZODONE HYDROCHLORIDE 40 MG/1
40 TABLET ORAL DAILY
Qty: 30 TABLET | Refills: 11 | Status: SHIPPED | OUTPATIENT
Start: 2025-04-21 | End: 2026-04-16

## 2025-05-05 DIAGNOSIS — G47.00 INSOMNIA, UNSPECIFIED TYPE: Primary | ICD-10-CM

## 2025-05-05 RX ORDER — DIAZEPAM 5 MG/1
5 TABLET ORAL EVERY 6 HOURS PRN
Qty: 6 TABLET | Refills: 0 | Status: SHIPPED | OUTPATIENT
Start: 2025-05-05 | End: 2025-05-08

## 2025-05-20 ENCOUNTER — OFFICE VISIT (OUTPATIENT)
Dept: NEUROLOGY | Facility: HOSPITAL | Age: 70
End: 2025-05-20
Payer: MEDICARE

## 2025-05-20 VITALS
DIASTOLIC BLOOD PRESSURE: 64 MMHG | WEIGHT: 174 LBS | SYSTOLIC BLOOD PRESSURE: 112 MMHG | HEART RATE: 53 BPM | BODY MASS INDEX: 27.25 KG/M2

## 2025-05-20 DIAGNOSIS — Z76.0 MEDICATION REFILL: ICD-10-CM

## 2025-05-20 DIAGNOSIS — G31.83 LEWY BODY DEMENTIA WITH AGITATION, UNSPECIFIED DEMENTIA SEVERITY (MULTI): Primary | ICD-10-CM

## 2025-05-20 DIAGNOSIS — F02.811 LEWY BODY DEMENTIA WITH AGITATION, UNSPECIFIED DEMENTIA SEVERITY (MULTI): Primary | ICD-10-CM

## 2025-05-20 PROCEDURE — 3074F SYST BP LT 130 MM HG: CPT | Performed by: PSYCHIATRY & NEUROLOGY

## 2025-05-20 PROCEDURE — 3078F DIAST BP <80 MM HG: CPT | Performed by: PSYCHIATRY & NEUROLOGY

## 2025-05-20 PROCEDURE — 1159F MED LIST DOCD IN RCRD: CPT | Performed by: PSYCHIATRY & NEUROLOGY

## 2025-05-20 PROCEDURE — G2211 COMPLEX E/M VISIT ADD ON: HCPCS | Performed by: PSYCHIATRY & NEUROLOGY

## 2025-05-20 PROCEDURE — 99214 OFFICE O/P EST MOD 30 MIN: CPT | Performed by: PSYCHIATRY & NEUROLOGY

## 2025-05-20 PROCEDURE — 1157F ADVNC CARE PLAN IN RCRD: CPT | Performed by: PSYCHIATRY & NEUROLOGY

## 2025-05-20 PROCEDURE — 99214 OFFICE O/P EST MOD 30 MIN: CPT | Mod: GC | Performed by: PSYCHIATRY & NEUROLOGY

## 2025-05-20 PROCEDURE — 1123F ACP DISCUSS/DSCN MKR DOCD: CPT | Performed by: PSYCHIATRY & NEUROLOGY

## 2025-05-20 RX ORDER — QUETIAPINE FUMARATE 25 MG/1
TABLET, FILM COATED ORAL
Qty: 180 TABLET | Refills: 2 | Status: SHIPPED | OUTPATIENT
Start: 2025-05-20

## 2025-05-20 NOTE — PROGRESS NOTES
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 The patient is not able to report any symptoms but his wife was able to fill in the history. He seems down during the exam. While discussing his symptoms he kept saying how his case is sad and he should just be buried.     His wife is in the room and reports the patient's cognition and physical state has declined since February. The patient says he doesn't notice much but his wife does. She reports that his short and long term memory have been going down. The majority of the time the patient will not be making sense on conversations. He is able to continue his ADL's but has not been driving for a while.  As far as his physical decline his wife says he is not necessarily any weaker but he is slower in his movements and reports feeling unsteady at time.     He sees people in the house and hears things. He cannot recall any events but she says all the hallucinations are pleasant thoughts. No thoughts or delusions of hurting himself or others. He is however, paranoid about people taking his things.     There has been some mood changes. She feels he is off and on. He can get frustrated over things that wouldn't frustrate him in the past. His wife feels that he is not finding enjoyment in things anymore.      He is not able to recall anything from yesterday or last week. After prompting from his wife he was able to recall volunteering at a homeless shelter.     Gabapentin 300 mg at bedtime controls RLS.      His sleep has been under control. His wife says his legs are still kicking but it is improved from before.     Objective  Neurological Exam  Physical Exam     Mental Status Examination:  General appearance: Well-groomed, good eye contact, cooperative  Orientation: Alert and oriented to person, place,  and time  Motor: no psychomotor agitation or retardation, stable gait  Speech: regular rate, rhythm, tone, and volume  Mood: down  Affect: stable, depressed  Passive death wish: denies  Suicidal ideation: denies  Thought process: logical, linear, and goal-directed without loosening of associations  Thought content: auditory and visual hallucinations, delusions, and not responding to internal stimuli  Insight/Judgment: good/good  Praxis: Transitive/Intransitive/Orofacial  Fund of knowledge: good  Recent and remote memory: poor  Attention span and concentration: poor  Language: normal receptive and expressive language without paraphrasic errors     [facility]- [facility] Cognitive Assessment (  11/  15/ 2024) : 10/30   Visuospatial / Executive: 3/5  Namin/3  Read List of Digits: 2/2  Read List of Letters: 1/1  Serial Sevens: 0/3   Language Repeat: 2/2   Language Fluency: 0/1   Abstraction: 0/2   Delayed Recall: 0/5   Orientation: 0/6  Education:  Medical Doctor     NEUROLOGICAL EXAMINATION     Opthalmoscopic:   Normal.  Fundi were well visualized with normal disc margins, clear vessels, and vascular pulsations, No disc edema.  The cup/disk ratio was not enlarged.  No hemorrhages or exudates were present in the posterior segments that were visualized.     Cranial nerves:  CN II: visual fields full to confrontation  CN III, IV, VI: Pupils round, reactive to light and accommodation.  Lids symmetric.  No ptosis.  Extra-occular muscles are intact with normal alignment.  No nystagmus  CN V: Facial sensation intact bilaterally.    CN VII: Normal and symmetric facial strength.  Nasolabial folds symmetric  CN VIII: Hearing intact to finger rub  CN IX: Palate elevates symmetrically.  CN XI: Normal shoulder shrug and neck turning  CN XII: Tongue midline, with normal bulk and strength, no fasciculations       Motor:  Muscle bulk was normal in all extremities.  5/5 strength in all extremities  Normal finger taps and hand  "opening  Normal tone  No abnormal movements     Reflexes:   Right UE     LEFT UE  BR: 2          BR: 2  Biceps: 2    Biceps: 2  Triceps: 2   Triceps: 2     RIGHT LE     LEFT LE  Knee: 2        Knee: 2  Ankle: 2       Ankle: 2     Negative Kalani's reflex  No frontal release signs     Coordination:  Normal finger to nose testing and rapid alternating movements     Gait:  Able to stand from seated position with arms across chest  Stable gait, reduced arm swing on the right side     Sensory:  Negative Romberg's sign     Assessment/Plan  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 His cognition has declined since last visit. He is not able to recall any events in the past. He has also become more tangential. Throughout the exam he was making jokes about ending his life as he feels \"useless\".     Plan:  - Continue quetiapine 50 mg, Gabapentin 300 mg, Donepezil 10 mg, trazodone 50 mg   - Pt offered psychiatry referral, wife believes she is able to control his mood at this point. Can pursue when family is agreeable.  - Follow up in 6 months         Time spent *** min on the day of service, which included preparing to see the patient, face-to-face patient care, completing clinical documentation, obtaining and/or reviewing separately obtained history, performing a medically appropriate examination, counseling and educating the patient/family/caregiver, and ordering medications, tests, or procedures.    "

## 2025-05-20 NOTE — PATIENT INSTRUCTIONS
It was a pleasure seeing you today. It seems you may be having more hallucinations than before, some of which may be frustrating to you. For this, we will slightly increase your dose of seroquel.     For seroquel, you are currently on 50mg twice a day. We will increase this to 50mg in the morning and 75mg at night. You can add an extra 25mg to the morning dose if there are worse hallucinations and/or irritability in the morning.     Please get an EKG done, such as with your PCP.      Follow-up in 3 months    General brain health guidelines:  - make sure your other medical conditions are well controlled (e.g., high blood pressure, high cholesterol, diabetes, sleep apnea etc)  - do not smoke or chew tobacco  - address any sensory deficits (e.g., proper glasses for poor eyesight, hearing aids for hearing loss)  - use a weekly pill box  - eat a heart healthy diet (e.g., lots of fruits and vegetables, low fat and cholesterol)  - exercise at least four days per week, 30 minutes at a time at an intensity that would make it difficult to converse with someone   - make sure you are getting at least 7 hours of quality sleep per night  - keep yourself mentally active daily by reading, playing cards, doing word searches, puzzles, etc.  - stay socially active by being part of a group or organization

## 2025-05-20 NOTE — PROGRESS NOTES
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    Last seen 11/15/24:  - Cognition - Pt doesn’t notice much change since 2/2024, but wife reported decline in pt’s STM & LTM; she reported pt notmaking sense in conversation. Remains independent w/ADL but has not been driving for a while.  - Motor - Movements are slower and pt more unsteady at times  - Mood - wife described pt as “off and on”, easily frustrated, and not finding enjoyment in things anymore. During appt, pt was noted to be down and making comments about how his case is sad and he should just be buried.  - Psych - Pt having AVH, but noted to be pleasant thoughts. Pt is also paranoid about people taking his things.  - Sleep - RLS controlled on  qHS  PLAN: psych offered but wife deferred. Continuing other meds (Seroquel 50 BID,  qHS, Donepezil 10mg daily, traz 50 vs 75mg at bedtime    TODAY, pt is accompanied by wife who provides checklist of changes since last visit (see media tab). Pt has had worsening memory, sense of time, as well as increased need for supervision for ADLs such as bathing, eating, dressing. He has also been more irritable w/mood swings and experiencing hallucinations and delusions. Pt often reacts w/frustration to the \"many people\" that are in the house when there is no one else at home. He sometimes becomes suspicious of wife \"poisoning him\" with his meds, but this is less of an issue recently. Wife believes he hasn't gotten to the point of putting himself or others in danger as a result of these hallucinations. Pt also noted to be more disinhibited than previously, as evidenced by him greeting [employer] friends by kissing them.     Appetite and sleep is good, though pt often needs prompting to eat his meals. Pt also remains social, " volunteering at a homeless shelter 2x/w, which is the time that wife uses to run errands. Pt and wife also remain social by regularly attending Worship. On a day to day basis, pt often occupies his time w/reading.     Denies falls    Regarding pt's meds, wife feels that the seroquel helps with his agitation/irritability. Pt gets sleepy after breakfast, but is not overly sedated (pt's mentation during appt was deemed his average degree of wakefulness).     Medical History[1]   Surgical History[2]  Current Outpatient Medications   Medication Instructions    acetaminophen (Tylenol 8 Hour) 650 mg ER tablet 2 tablets, oral, 2 times daily    Alpha tocopherol (VITAMIN E) 400 Units, oral, Daily    ascorbic acid (Vitamin C) 1,000 mg tablet 1 tablet, oral, 2 times daily    buPROPion SR (WELLBUTRIN SR) 200 mg, oral, Every 12 hours    ciclopirox (Penlac) 8 % solution APPLY TO THE AFFECTED AREA(S) externally ONCE DAILY    clopidogrel (PLAVIX) 75 mg, oral, Daily    coenzyme Q-10 200 mg capsule oral    donepezil (ARICEPT) 10 mg, oral, Daily    ezetimibe (Zetia) 10 mg tablet TAKE 1 TABLET (10mg) BY MOUTH ONCE DAILY    gabapentin (NEURONTIN) 300 mg, oral, Nightly    levothyroxine (SYNTHROID, LEVOXYL) 200 mcg, oral, Daily, as directed    losartan (COZAAR) 100 mg, oral, Daily    melatonin-C-D3-zinc-elderberry 0.5 mg-45 mg- 12.5 mcg-3.75mg tablet,chewable oral    multivit-iron-minerals-folic acid (Centrum Silver) 0.4 mg-300 mcg- 250 mcg tab 1 tablet, oral, Daily    QUEtiapine (SEROquel) 25 mg tablet Take 50mg (2tab) in the morning and 75mg (3 tab) at night. If there are worsening hallucinations or irritability during the day, you can take an extra 25mg (1 tab) with the morning dose.    rosuvastatin (CRESTOR) 40 mg, oral, Daily    traZODone (Desyrel) 50 mg tablet TAKE 1 & 1/2 (ONE AND ONE-HALF) TABLETS BY MOUTH AT BEDTIME    vilazodone (VIIBRYD) 40 mg, oral, Daily     Family History[3]  Social History[4]      /64   Pulse 53   Wt  "78.9 kg (174 lb)   BMI 27.25 kg/m²   NEUROLOGICAL EXAMINATION     MSE: awake, alert. Able to state own name. However, when asked \"where are we right now\", pt answers \"I'm in the same place you are\". When probed further, pt quickly becomes tangential, commenting on his hand movements, why people are making him do certain things, though never fully completing a thought. The same occurs when asked to mimic playing a violin or the piano; of note, pt was able to demonstrate how to comb hair and brush teeth. Pt had difficulty saying months of the year forwards and backwards. When asked to count backwards, he counts 100 through 90, but then skips to 80, 81, 82....  Appears to also become somewhat emotional when wife comments on symptoms (e.g. impulsivity, irritability).      Cranial nerves:  CN II: visual fields full to confrontation  CN III, IV, VI: Pupils round. Lids symmetric.  No ptosis.  Mild upgaze impairment, but intact downgaze. Horizontal EOM intact. No nystagmus  CN V: Facial sensation intact bilaterally.    CN VII: Normal and symmetric facial strength.  Nasolabial folds symmetric  CN VIII: Hearing intact to convo  CN IX: Palate elevates symmetrically.  CN XI: Normal shoulder shrug  CN XII: Tongue midline, with normal bulk and strength, no fasciculations       Motor:  Muscle bulk was normal in all extremities.  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 Cognitive sx continue to " progress, with the prominent ones being r/t irritability, hallucinations, and impulsivity. Exam notable for increased tangential speech. While his hallucinations seem rather benign at this time, it's possible for them to progress to a point in which he feels compelled to physically react to them, which could place him at risk for falls or other accidents. His irritability which partially stems from his hallucinations also affects their day to day life. At this time, we will increase his seroquel slightly in an effort to address these sx. He will also need an updated EKG with this increased dose.   No other refills needed per wife/pt.     PLAN:  - Increase seroquel 50mg BID to 50mg in the AM / 75mg in the PM   - Added option to give PRN 25mg with daytime dose for breakthrough agitation/irritability.    - Note that rx was adjusted to use 25mg tablets instead of previous 50mg tablets     - Pt/wife notified of this specific adjustment  - Continue  at bedtime, donepezil 10mg daily, trazodone 75mg at bedtime  - Obtain updated EKG  - Follow-up in 3-6 months           [1]   Past Medical History:  Diagnosis Date    Aftercare following joint replacement surgery 04/09/2021    Aftercare following right knee joint replacement surgery    Bicipital tendinitis, left shoulder 06/16/2021    Tendinitis of upper biceps tendon of left shoulder    Bilateral primary osteoarthritis of knee 09/04/2020    Primary osteoarthritis of both knees    Contact with and (suspected) exposure to covid-19 12/08/2020    Suspected COVID-19 virus infection    Contact with and (suspected) exposure to other viral communicable diseases 07/30/2020    Other viral disease contact    COVID-19     COVID-19    Disorientation, unspecified 03/25/2022    Delirium, subacute    Encounter for general adult medical examination without abnormal findings 06/22/2020    Medicare annual wellness visit, initial    Impingement syndrome of left shoulder 04/14/2021     Impingement syndrome of left shoulder    Impingement syndrome of right shoulder 03/20/2021    Impingement syndrome of right shoulder    Laceration without foreign body of right middle finger without damage to nail, initial encounter 10/07/2020    Laceration of right middle finger without foreign body without damage to nail, initial encounter    Laceration without foreign body of right ring finger without damage to nail, initial encounter 10/07/2020    Laceration of right ring finger without foreign body without damage to nail, initial encounter    Major depressive disorder, recurrent severe without psychotic features (Multi) 07/03/2021    Severe episode of recurrent major depressive disorder, without psychotic features    Mild cognitive impairment of uncertain or unknown etiology 03/25/2022    Mild cognitive impairment    Obstructive and reflux uropathy, unspecified 01/19/2022    Lower urinary obstructive symptom    Other amnesia 02/08/2022    Memory changes    Other hypersomnia 01/15/2020    Excessive daytime sleepiness    Other specified anxiety disorders 02/08/2022    Depression with anxiety    Other specified health status 07/30/2020    Unknown status of immunity to COVID-19 virus    Other symptoms and signs involving the musculoskeletal system 03/10/2021    RUE weakness    Other symptoms and signs involving the musculoskeletal system 04/09/2021    Weakness of right lower extremity    Other symptoms and signs involving the musculoskeletal system 09/28/2021    Weakness of shoulder    Pain in left knee     Left knee pain, unspecified chronicity    Pain in left shoulder     Acute pain of left shoulder    Pain in right knee 04/09/2021    Knee pain, bilateral    Pain in unspecified shoulder 04/08/2021    Shoulder pain, unspecified chronicity, unspecified laterality    Personal history of other diseases of the circulatory system     History of hypertension    Personal history of other diseases of the musculoskeletal  system and connective tissue     History of osteoarthritis    Personal history of other diseases of the respiratory system 03/21/2021    History of acute bacterial sinusitis    Personal history of other endocrine, nutritional and metabolic disease     History of hyperlipidemia    Personal history of other endocrine, nutritional and metabolic disease 06/08/2021    History of hyperglycemia    Personal history of other specified conditions 03/25/2022    History of confusion    Personal history of other specified conditions 01/25/2021    History of shortness of breath    Somnolence 03/11/2020    Daytime somnolence    Stiffness of right knee, not elsewhere classified 04/09/2021    Decreased range of motion of right knee    Stiffness of right knee, not elsewhere classified 09/04/2020    Decreased range of motion of both knees    Unilateral primary osteoarthritis, right knee 08/17/2021    Primary osteoarthritis of right knee    Unspecified rotator cuff tear or rupture of left shoulder, not specified as traumatic 06/16/2021    Rotator cuff tear, left    Unspecified symptoms and signs involving the genitourinary system 09/27/2021    UTI symptoms    Visual hallucinations 05/10/2022    Visual hallucinations   [2]   Past Surgical History:  Procedure Laterality Date    OTHER SURGICAL HISTORY  01/23/2019    Tonsillectomy    OTHER SURGICAL HISTORY  01/23/2019    Knee surgery    OTHER SURGICAL HISTORY  01/23/2019    Cardiac catheterization with stent placement    OTHER SURGICAL HISTORY  01/21/2021    Catheter ablation    OTHER SURGICAL HISTORY  01/21/2021    Percutaneous transluminal coronary angioplasty    OTHER SURGICAL HISTORY  02/23/2021    Knee replacement    OTHER SURGICAL HISTORY  07/12/2019    Shoulder surgery    OTHER SURGICAL HISTORY  07/12/2019    Arterial stent placement   [3] No family history on file.  [4]   Social History  Socioeconomic History    Marital status:    Tobacco Use    Smoking status: Never     Smokeless tobacco: Never   Substance and Sexual Activity    Alcohol use: Never    Drug use: Never

## 2025-06-10 ENCOUNTER — APPOINTMENT (OUTPATIENT)
Dept: PRIMARY CARE | Facility: CLINIC | Age: 70
End: 2025-06-10
Payer: MEDICARE

## 2025-06-10 VITALS
DIASTOLIC BLOOD PRESSURE: 70 MMHG | WEIGHT: 174 LBS | TEMPERATURE: 98.3 F | HEIGHT: 67 IN | SYSTOLIC BLOOD PRESSURE: 120 MMHG | BODY MASS INDEX: 27.31 KG/M2 | OXYGEN SATURATION: 95 % | HEART RATE: 94 BPM

## 2025-06-10 DIAGNOSIS — Z00.00 MEDICARE ANNUAL WELLNESS VISIT, SUBSEQUENT: Primary | ICD-10-CM

## 2025-06-10 DIAGNOSIS — Z86.0100 HISTORY OF COLON POLYPS: ICD-10-CM

## 2025-06-10 DIAGNOSIS — N40.0 BENIGN PROSTATIC HYPERPLASIA WITHOUT LOWER URINARY TRACT SYMPTOMS: ICD-10-CM

## 2025-06-10 DIAGNOSIS — F33.41 RECURRENT MAJOR DEPRESSIVE DISORDER, IN PARTIAL REMISSION: ICD-10-CM

## 2025-06-10 DIAGNOSIS — G31.83 DEMENTIA, LEWY BODY WITH BEHAVIOR DISTURBANCE (MULTI): ICD-10-CM

## 2025-06-10 DIAGNOSIS — E03.9 HYPOTHYROIDISM, UNSPECIFIED TYPE: ICD-10-CM

## 2025-06-10 DIAGNOSIS — E55.9 VITAMIN D DEFICIENCY: ICD-10-CM

## 2025-06-10 DIAGNOSIS — G47.00 INSOMNIA, UNSPECIFIED TYPE: ICD-10-CM

## 2025-06-10 DIAGNOSIS — F41.9 ANXIETY AND DEPRESSION: ICD-10-CM

## 2025-06-10 DIAGNOSIS — G25.81 RESTLESS LEGS SYNDROME: ICD-10-CM

## 2025-06-10 DIAGNOSIS — D50.0 IRON DEFICIENCY ANEMIA SECONDARY TO BLOOD LOSS (CHRONIC): ICD-10-CM

## 2025-06-10 DIAGNOSIS — F03.B18 MODERATE DEMENTIA WITH BEHAVIORAL DISTURBANCE (MULTI): ICD-10-CM

## 2025-06-10 DIAGNOSIS — I25.10 ASHD (ARTERIOSCLEROTIC HEART DISEASE): ICD-10-CM

## 2025-06-10 DIAGNOSIS — I25.10 CORONARY ARTERY DISEASE INVOLVING NATIVE HEART, UNSPECIFIED VESSEL OR LESION TYPE, UNSPECIFIED WHETHER ANGINA PRESENT: ICD-10-CM

## 2025-06-10 DIAGNOSIS — E78.2 MIXED HYPERLIPIDEMIA: ICD-10-CM

## 2025-06-10 DIAGNOSIS — F51.04 CHRONIC INSOMNIA: ICD-10-CM

## 2025-06-10 DIAGNOSIS — F02.818 DEMENTIA, LEWY BODY WITH BEHAVIOR DISTURBANCE (MULTI): ICD-10-CM

## 2025-06-10 DIAGNOSIS — E03.8 OTHER SPECIFIED HYPOTHYROIDISM: ICD-10-CM

## 2025-06-10 DIAGNOSIS — I10 BENIGN ESSENTIAL HYPERTENSION: ICD-10-CM

## 2025-06-10 DIAGNOSIS — F32.A ANXIETY AND DEPRESSION: ICD-10-CM

## 2025-06-10 PROCEDURE — 3074F SYST BP LT 130 MM HG: CPT | Performed by: FAMILY MEDICINE

## 2025-06-10 PROCEDURE — 99214 OFFICE O/P EST MOD 30 MIN: CPT | Performed by: FAMILY MEDICINE

## 2025-06-10 PROCEDURE — 3008F BODY MASS INDEX DOCD: CPT | Performed by: FAMILY MEDICINE

## 2025-06-10 PROCEDURE — 1170F FXNL STATUS ASSESSED: CPT | Performed by: FAMILY MEDICINE

## 2025-06-10 PROCEDURE — 93000 ELECTROCARDIOGRAM COMPLETE: CPT | Performed by: FAMILY MEDICINE

## 2025-06-10 PROCEDURE — G0439 PPPS, SUBSEQ VISIT: HCPCS | Performed by: FAMILY MEDICINE

## 2025-06-10 PROCEDURE — 3078F DIAST BP <80 MM HG: CPT | Performed by: FAMILY MEDICINE

## 2025-06-10 PROCEDURE — 1159F MED LIST DOCD IN RCRD: CPT | Performed by: FAMILY MEDICINE

## 2025-06-10 PROCEDURE — 1160F RVW MEDS BY RX/DR IN RCRD: CPT | Performed by: FAMILY MEDICINE

## 2025-06-10 RX ORDER — PRAMIPEXOLE DIHYDROCHLORIDE 0.5 MG/1
TABLET ORAL
COMMUNITY

## 2025-06-10 RX ORDER — ESCITALOPRAM OXALATE 20 MG/1
TABLET ORAL EVERY 24 HOURS
COMMUNITY

## 2025-06-10 RX ORDER — ALPRAZOLAM 0.5 MG/1
TABLET ORAL
COMMUNITY

## 2025-06-10 RX ORDER — LEVOTHYROXINE SODIUM 200 UG/1
200 TABLET ORAL DAILY
Qty: 90 TABLET | Refills: 3 | Status: SHIPPED | OUTPATIENT
Start: 2025-06-10 | End: 2026-06-10

## 2025-06-10 ASSESSMENT — ACTIVITIES OF DAILY LIVING (ADL)
BATHING: INDEPENDENT
MANAGING_FINANCES: TOTAL CARE
DOING_HOUSEWORK: TOTAL CARE
DRESSING: INDEPENDENT
MANAGING_FINANCES: TOTAL CARE
GROCERY_SHOPPING: TOTAL CARE
TAKING_MEDICATION: TOTAL CARE

## 2025-06-10 ASSESSMENT — PATIENT HEALTH QUESTIONNAIRE - PHQ9
2. FEELING DOWN, DEPRESSED OR HOPELESS: SEVERAL DAYS
1. LITTLE INTEREST OR PLEASURE IN DOING THINGS: SEVERAL DAYS
SUM OF ALL RESPONSES TO PHQ9 QUESTIONS 1 AND 2: 2

## 2025-06-10 NOTE — PROGRESS NOTES
Subjective   Reason for Visit: Ousmane Samuel is an 69 y.o. male here for a Medicare Wellness visit.     Past Medical, Surgical, and Family History reviewed and updated in chart.    Reviewed all medications by prescribing practitioner or clinical pharmacist (such as prescriptions, OTCs, herbal therapies and supplements) and documented in the medical record.    HPI  Reviewed Chronic illnesess  History of Present Illness  The patient presents for depression, elevated blood pressure, and kidney stone.    He reports a recurrence of kidney stone symptoms, which began yesterday. He is uncertain if the previous stone has fully passed or if a new one has formed. He expresses concern about potential urinary obstruction and subsequent renal inflammation. He also mentions feeling fatigued. He reports no urinary issues such as frequency, hesitancy, or dysuria. He maintains adequate hydration and consumes minimal caffeine.    His depression has been well-managed with Viibryd. His neurologist increased his quetiapine dosage last month to 50 mg in the morning and 75 mg at night. He reports no chest pain, palpitations, lower extremity edema, frequent headaches, or visual disturbances such as double vision or loss of vision. He experiences headaches when chewing hard substances. His auditory hallucinations have improved, but he continues to experience visual hallucinations. His sleep quality has improved with quetiapine, and he no longer experiences awakenings at night. He requires assistance with personal hygiene tasks such as bathing and dressing. He engages in physical activity, including walking, when weather permits.    He is requesting a refill of his levothyroxine.     Results       No visits with results within 1 Month(s) from this visit.   Latest known visit with results is:   Lab on 06/26/2024   Component Date Value    Thyroid Stimulating Horm* 06/26/2024 4.98 (H)     WBC 06/26/2024 5.9     nRBC 06/26/2024 0.0     RBC  "06/26/2024 4.51     Hemoglobin 06/26/2024 13.9     Hematocrit 06/26/2024 42.5     MCV 06/26/2024 94     MCH 06/26/2024 30.8     MCHC 06/26/2024 32.7     RDW 06/26/2024 11.9     Platelets 06/26/2024 152     Neutrophils % 06/26/2024 65.1     Immature Granulocytes %,* 06/26/2024 0.2     Lymphocytes % 06/26/2024 18.6     Monocytes % 06/26/2024 13.6     Eosinophils % 06/26/2024 2.2     Basophils % 06/26/2024 0.3     Neutrophils Absolute 06/26/2024 3.84     Immature Granulocytes Ab* 06/26/2024 0.01     Lymphocytes Absolute 06/26/2024 1.10 (L)     Monocytes Absolute 06/26/2024 0.80     Eosinophils Absolute 06/26/2024 0.13     Basophils Absolute 06/26/2024 0.02     Glucose 06/26/2024 102 (H)     Sodium 06/26/2024 143     Potassium 06/26/2024 4.0     Chloride 06/26/2024 107     Bicarbonate 06/26/2024 27     Anion Gap 06/26/2024 13     Urea Nitrogen 06/26/2024 20     Creatinine 06/26/2024 0.89     eGFR 06/26/2024 >90     Calcium 06/26/2024 9.4     Albumin 06/26/2024 4.5     Alkaline Phosphatase 06/26/2024 48     Total Protein 06/26/2024 6.3 (L)     AST 06/26/2024 18     Bilirubin, Total 06/26/2024 0.5     ALT 06/26/2024 22     Cholesterol 06/26/2024 131     HDL-Cholesterol 06/26/2024 54.2     Cholesterol/HDL Ratio 06/26/2024 2.4     LDL Calculated 06/26/2024 54     VLDL 06/26/2024 23     Triglycerides 06/26/2024 116     Non HDL Cholesterol 06/26/2024 77     Vitamin B12 06/26/2024 607     Vitamin D, 25-Hydroxy, T* 06/26/2024 38     Ferritin 06/26/2024 36     Prostate Specific AG 06/26/2024 0.83     Thyroxine, Free 06/26/2024 0.65       Patient Care Team:  Mason Michael DO as PCP - General  Mason Michael DO as PCP - [facility] Attributed Provider     Review of Systems    Objective   Vitals:  /70 (BP Location: Left arm, Patient Position: Sitting, BP Cuff Size: Adult)   Pulse 94   Temp 36.8 °C (98.3 °F)   Ht 1.702 m (5' 7\")   Wt 78.9 kg (174 lb)   SpO2 95%   BMI 27.25 kg/m²       Physical Exam  General: Patient is " alert and oriented Ãƒâ€”3 and appears in no acute distress. No respiratory distress.     Head: Atraumatic normocephalic.     Eyes: EOMI, PERRLA      Neck: Normal range of motion, no masses. Thyroid is palpable and normal in size without any nodules. No anterior cervical or posterior cervical adenopathy.     Heart: Regular rate and rhythm, every 3rd beat was a dropped beat which the patient states is his normal, no murmurs clicks or gallops     Lungs: Clear to auscultation bilaterally without any rhonchi rales or wheezing, lung sounds heard throughout all lung fields     Abdomen: Soft, nontender, no rigidity, rebound, guarding or organomegaly. Bowel sounds Ãƒâ€”4 quadrants.     Musculoskeletal: Normal range of motion, strength is grossly intact in the proximal distal muscles of the upper and lower extremities bilaterally, deep tendon reflexes +2 out of 4 and symmetric bilaterally at the patella, Achilles, biceps, triceps, sensation intact.     Nerves: Cranial nerves II through XII appear grossly intact and without deficit     Skin: Intact, dry, no rashes or erythema     Psych: Flat affect   Assessment/Plan   Problem List Items Addressed This Visit       Anxiety and depression    ASHD (arteriosclerotic heart disease)    Relevant Orders    ECG 12 Lead    Benign essential hypertension    Relevant Orders    CBC and Auto Differential    Comprehensive Metabolic Panel    TSH with reflex to Free T4 if abnormal    Vitamin B12    Lipid Panel    Magnesium    Ferritin    Chronic insomnia    Moderate dementia with behavioral disturbance (Multi)    Dementia, Lewy body with behavior disturbance (Multi)    History of colon polyps    Hypothyroidism    Relevant Medications    levothyroxine (Synthroid, Levoxyl) 200 mcg tablet    Other Relevant Orders    CBC and Auto Differential    Comprehensive Metabolic Panel    TSH with reflex to Free T4 if abnormal    Vitamin B12    Lipid Panel    Magnesium    Ferritin    Iron deficiency anemia  secondary to blood loss (chronic)    Relevant Orders    CBC and Auto Differential    Comprehensive Metabolic Panel    TSH with reflex to Free T4 if abnormal    Vitamin B12    Lipid Panel    Magnesium    Ferritin    Mixed hyperlipidemia    Restless legs syndrome    Relevant Orders    CBC and Auto Differential    Comprehensive Metabolic Panel    TSH with reflex to Free T4 if abnormal    Vitamin B12    Lipid Panel    Magnesium    Ferritin    Vitamin D deficiency    Relevant Orders    Vitamin D 25-Hydroxy,Total (for eval of Vitamin D levels)     Other Visit Diagnoses         Medicare annual wellness visit, subsequent    -  Primary      Coronary artery disease involving native heart, unspecified vessel or lesion type, unspecified whether angina present          Insomnia, unspecified type          Recurrent major depressive disorder, in partial remission          Benign prostatic hyperplasia without lower urinary tract symptoms                 Assessment & Plan  1. Depression.  - His depression is currently being managed with Viibryd and quetiapine.  - The neurologist increased the quetiapine dosage last month to 50 mg in the morning and 75 mg at night.  - He reports that his sleep has been stable with the current medication regimen.  - A recheck of his ferritin levels will be conducted due to previous elevation and its potential link to restless legs syndrome.    2. Elevated blood pressure.  - His blood pressure was initially elevated but normalized to 120/70 upon recheck.  - Increased pain and limited mobility.  - He is advised to monitor his blood pressure regularly.  - No issues with chest pain, swelling in the legs, headaches, or vision changes.    3. Medication management.  - A prescription for levothyroxine 200 mcg daily will be sent to his pharmacy.  - Thyroid function will be rechecked.  - No changes in hearing or need for further evaluation.  - Up to date on colon cancer screening and vaccines. PSA was checked  last year and does not need to be repeated at this time as he is not having any symptoms.     Discussed depression and anxiety-stable  -Viibryd 40 mg daily     Coronary artery disease with history of stents-stable  Previous doctor was Dr. Russell Currently stable     Expressions dementia with behavioral disturbances secondary to Lewy body dementia-worsening  Neurologist is Dr. Bishop     Hyperlipidemia  Controlled on Zetia 10 mg 1 p.o. daily and rosuvastatin 40 mg 1 p.o. daily     Hypothyroidism  Controlled on levothyroxine 200 mcg daily     Essential hypertension  Controlled on losartan 100 mg daily     RLS  - Stopped Requip and hallucinations are improving.   - Gabapentin is helping at 300 mg   - Trazodone and Melatonin for the sleep      - Colonoscopy 2023 was normal.  Cologuard in  2028  - PSA ordered.  Normal 2023.   - No night sweats/ appetite changes/ abnormal lumps     Labs in 6 months

## 2025-06-17 LAB
25(OH)D3+25(OH)D2 SERPL-MCNC: 43 NG/ML (ref 30–100)
ALBUMIN SERPL-MCNC: 4.3 G/DL (ref 3.6–5.1)
ALP SERPL-CCNC: 52 U/L (ref 35–144)
ALT SERPL-CCNC: 19 U/L (ref 9–46)
ANION GAP SERPL CALCULATED.4IONS-SCNC: 7 MMOL/L (CALC) (ref 7–17)
AST SERPL-CCNC: 18 U/L (ref 10–35)
BASOPHILS # BLD AUTO: 19 CELLS/UL (ref 0–200)
BASOPHILS NFR BLD AUTO: 0.3 %
BILIRUB SERPL-MCNC: 0.4 MG/DL (ref 0.2–1.2)
BUN SERPL-MCNC: 18 MG/DL (ref 7–25)
CALCIUM SERPL-MCNC: 8.9 MG/DL (ref 8.6–10.3)
CHLORIDE SERPL-SCNC: 107 MMOL/L (ref 98–110)
CHOLEST SERPL-MCNC: 142 MG/DL
CHOLEST/HDLC SERPL: 2.7 (CALC)
CO2 SERPL-SCNC: 29 MMOL/L (ref 20–32)
CREAT SERPL-MCNC: 0.83 MG/DL (ref 0.7–1.28)
EGFRCR SERPLBLD CKD-EPI 2021: 94 ML/MIN/1.73M2
EOSINOPHIL # BLD AUTO: 93 CELLS/UL (ref 15–500)
EOSINOPHIL NFR BLD AUTO: 1.5 %
ERYTHROCYTE [DISTWIDTH] IN BLOOD BY AUTOMATED COUNT: 11.8 % (ref 11–15)
FERRITIN SERPL-MCNC: 15 NG/ML (ref 24–380)
GLUCOSE SERPL-MCNC: 98 MG/DL (ref 65–99)
HCT VFR BLD AUTO: 41.2 % (ref 38.5–50)
HDLC SERPL-MCNC: 53 MG/DL
HGB BLD-MCNC: 13.4 G/DL (ref 13.2–17.1)
LDLC SERPL CALC-MCNC: 70 MG/DL (CALC)
LYMPHOCYTES # BLD AUTO: 1271 CELLS/UL (ref 850–3900)
LYMPHOCYTES NFR BLD AUTO: 20.5 %
MAGNESIUM SERPL-MCNC: 2 MG/DL (ref 1.5–2.5)
MCH RBC QN AUTO: 30.8 PG (ref 27–33)
MCHC RBC AUTO-ENTMCNC: 32.5 G/DL (ref 32–36)
MCV RBC AUTO: 94.7 FL (ref 80–100)
MONOCYTES # BLD AUTO: 806 CELLS/UL (ref 200–950)
MONOCYTES NFR BLD AUTO: 13 %
NEUTROPHILS # BLD AUTO: 4011 CELLS/UL (ref 1500–7800)
NEUTROPHILS NFR BLD AUTO: 64.7 %
NONHDLC SERPL-MCNC: 89 MG/DL (CALC)
PLATELET # BLD AUTO: 166 THOUSAND/UL (ref 140–400)
PMV BLD REES-ECKER: 9.8 FL (ref 7.5–12.5)
POTASSIUM SERPL-SCNC: 3.9 MMOL/L (ref 3.5–5.3)
PROT SERPL-MCNC: 6.1 G/DL (ref 6.1–8.1)
RBC # BLD AUTO: 4.35 MILLION/UL (ref 4.2–5.8)
SODIUM SERPL-SCNC: 143 MMOL/L (ref 135–146)
TRIGL SERPL-MCNC: 103 MG/DL
TSH SERPL-ACNC: 1.35 MIU/L (ref 0.4–4.5)
VIT B12 SERPL-MCNC: 606 PG/ML (ref 200–1100)
WBC # BLD AUTO: 6.2 THOUSAND/UL (ref 3.8–10.8)

## 2025-06-18 NOTE — RESULT ENCOUNTER NOTE
The labs looked good except for the ferritin was low.  I would suggest that we restart iron and this may help out with restless legs

## 2025-08-22 DIAGNOSIS — Z76.0 MEDICATION REFILL: ICD-10-CM

## 2025-08-22 RX ORDER — QUETIAPINE FUMARATE 25 MG/1
TABLET, FILM COATED ORAL
Qty: 180 TABLET | Refills: 11 | Status: SHIPPED | OUTPATIENT
Start: 2025-08-22

## 2025-12-08 ENCOUNTER — APPOINTMENT (OUTPATIENT)
Dept: PRIMARY CARE | Facility: CLINIC | Age: 70
End: 2025-12-08
Payer: MEDICARE